# Patient Record
Sex: FEMALE | Race: WHITE | URBAN - METROPOLITAN AREA
[De-identification: names, ages, dates, MRNs, and addresses within clinical notes are randomized per-mention and may not be internally consistent; named-entity substitution may affect disease eponyms.]

---

## 2025-08-08 ENCOUNTER — APPOINTMENT (OUTPATIENT)
Dept: RADIOLOGY | Facility: HOSPITAL | Age: 80
DRG: 064 | End: 2025-08-08
Payer: MEDICARE

## 2025-08-08 ENCOUNTER — APPOINTMENT (OUTPATIENT)
Dept: CARDIOLOGY | Facility: HOSPITAL | Age: 80
DRG: 064 | End: 2025-08-08
Payer: MEDICARE

## 2025-08-08 ENCOUNTER — HOSPITAL ENCOUNTER (INPATIENT)
Facility: HOSPITAL | Age: 80
End: 2025-08-08
Attending: EMERGENCY MEDICINE | Admitting: INTERNAL MEDICINE
Payer: MEDICARE

## 2025-08-08 DIAGNOSIS — R55 SYNCOPE AND COLLAPSE: ICD-10-CM

## 2025-08-08 DIAGNOSIS — N17.9 ACUTE KIDNEY INJURY: ICD-10-CM

## 2025-08-08 DIAGNOSIS — I63.9 ACUTE CVA (CEREBROVASCULAR ACCIDENT) (MULTI): Primary | ICD-10-CM

## 2025-08-08 DIAGNOSIS — I50.32 CHRONIC HEART FAILURE WITH PRESERVED EJECTION FRACTION: ICD-10-CM

## 2025-08-08 DIAGNOSIS — I67.848 OTHER CEREBROVASCULAR VASOSPASM AND VASOCONSTRICTION: ICD-10-CM

## 2025-08-08 PROBLEM — F32.A ANXIETY AND DEPRESSION: Status: ACTIVE | Noted: 2025-06-19

## 2025-08-08 PROBLEM — I27.82 CHRONIC PULMONARY EMBOLISM (MULTI): Status: ACTIVE | Noted: 2025-08-08

## 2025-08-08 PROBLEM — S16.1XXA STRAIN OF NECK MUSCLE: Status: ACTIVE | Noted: 2025-08-08

## 2025-08-08 PROBLEM — R26.81 GAIT INSTABILITY: Status: ACTIVE | Noted: 2025-08-04

## 2025-08-08 PROBLEM — D47.3 ESSENTIAL (HEMORRHAGIC) THROMBOCYTHEMIA: Status: ACTIVE | Noted: 2023-05-31

## 2025-08-08 PROBLEM — N18.30 CRF (CHRONIC RENAL FAILURE), STAGE 3 (MODERATE) (MULTI): Status: ACTIVE | Noted: 2023-05-31

## 2025-08-08 PROBLEM — J34.2 DEVIATED SEPTUM: Status: ACTIVE | Noted: 2025-08-08

## 2025-08-08 PROBLEM — Z96.1 LENS REPLACED: Status: ACTIVE | Noted: 2018-04-18

## 2025-08-08 PROBLEM — M54.16 LUMBAR RADICULITIS: Status: ACTIVE | Noted: 2017-05-22

## 2025-08-08 PROBLEM — R47.01 APHASIA: Status: ACTIVE | Noted: 2025-08-08

## 2025-08-08 PROBLEM — E87.1 HYPONATREMIA: Status: ACTIVE | Noted: 2025-06-19

## 2025-08-08 PROBLEM — N39.0 ACUTE UTI (URINARY TRACT INFECTION): Status: ACTIVE | Noted: 2025-08-08

## 2025-08-08 PROBLEM — G31.84 MCI (MILD COGNITIVE IMPAIRMENT): Status: ACTIVE | Noted: 2025-08-04

## 2025-08-08 PROBLEM — M51.26 LUMBAR HERNIATED DISC: Status: ACTIVE | Noted: 2017-05-22

## 2025-08-08 PROBLEM — I48.0 PAROXYSMAL ATRIAL FIBRILLATION (MULTI): Status: ACTIVE | Noted: 2023-05-31

## 2025-08-08 PROBLEM — E11.9 TYPE 2 DIABETES MELLITUS: Status: ACTIVE | Noted: 2025-08-08

## 2025-08-08 PROBLEM — R73.01 IFG (IMPAIRED FASTING GLUCOSE): Status: ACTIVE | Noted: 2025-06-19

## 2025-08-08 PROBLEM — M48.061 SPINAL STENOSIS OF LUMBAR REGION: Status: ACTIVE | Noted: 2017-05-22

## 2025-08-08 PROBLEM — M43.10 ACQUIRED SPONDYLOLISTHESIS: Status: ACTIVE | Noted: 2017-05-22

## 2025-08-08 PROBLEM — I50.30 (HFPEF) HEART FAILURE WITH PRESERVED EJECTION FRACTION: Status: ACTIVE | Noted: 2025-06-19

## 2025-08-08 PROBLEM — M16.11 PRIMARY OSTEOARTHRITIS OF RIGHT HIP: Status: ACTIVE | Noted: 2025-08-08

## 2025-08-08 PROBLEM — F41.9 ANXIETY AND DEPRESSION: Status: ACTIVE | Noted: 2025-06-19

## 2025-08-08 PROBLEM — R04.0 EPISTAXIS: Status: ACTIVE | Noted: 2025-08-08

## 2025-08-08 PROBLEM — H91.93 BILATERAL HEARING LOSS: Status: ACTIVE | Noted: 2025-08-04

## 2025-08-08 PROBLEM — R82.998 URINE WBC INCREASED: Status: ACTIVE | Noted: 2025-06-19

## 2025-08-08 PROBLEM — M25.559 JOINT PAIN, HIP: Status: ACTIVE | Noted: 2025-08-08

## 2025-08-08 PROBLEM — E66.811 OBESITY, CLASS I, BMI 30-34.9: Status: ACTIVE | Noted: 2018-10-03

## 2025-08-08 PROBLEM — J18.9 PNEUMONIA OF BOTH UPPER LOBES: Status: ACTIVE | Noted: 2025-08-08

## 2025-08-08 PROBLEM — K21.9 GERD (GASTROESOPHAGEAL REFLUX DISEASE): Status: ACTIVE | Noted: 2025-06-19

## 2025-08-08 PROBLEM — E87.6 HYPOKALEMIA: Status: ACTIVE | Noted: 2025-08-08

## 2025-08-08 LAB
ALBUMIN SERPL BCP-MCNC: 4.4 G/DL (ref 3.4–5)
ALP SERPL-CCNC: 78 U/L (ref 33–136)
ALT SERPL W P-5'-P-CCNC: 8 U/L (ref 7–45)
AMMONIA PLAS-SCNC: 27 UMOL/L (ref 16–53)
AMPHETAMINES UR QL SCN: NORMAL
ANION GAP BLDV CALCULATED.4IONS-SCNC: 12 MMOL/L (ref 10–25)
ANION GAP SERPL CALCULATED.3IONS-SCNC: 13 MMOL/L (ref 10–20)
APPEARANCE UR: CLEAR
AST SERPL W P-5'-P-CCNC: 15 U/L (ref 9–39)
BACTERIA #/AREA URNS AUTO: ABNORMAL /HPF
BARBITURATES UR QL SCN: NORMAL
BASE EXCESS BLDV CALC-SCNC: 2.3 MMOL/L (ref -2–3)
BASOPHILS # BLD AUTO: 0.03 X10*3/UL (ref 0–0.1)
BASOPHILS NFR BLD AUTO: 0.3 %
BENZODIAZ UR QL SCN: NORMAL
BILIRUB SERPL-MCNC: 0.5 MG/DL (ref 0–1.2)
BILIRUB UR STRIP.AUTO-MCNC: NEGATIVE MG/DL
BNP SERPL-MCNC: 87 PG/ML (ref 0–99)
BODY TEMPERATURE: 37 DEGREES CELSIUS
BUN SERPL-MCNC: 18 MG/DL (ref 6–23)
BZE UR QL SCN: NORMAL
CA-I BLDV-SCNC: 1.2 MMOL/L (ref 1.1–1.33)
CALCIUM SERPL-MCNC: 9.4 MG/DL (ref 8.6–10.3)
CANNABINOIDS UR QL SCN: NORMAL
CARDIAC TROPONIN I PNL SERPL HS: 6 NG/L (ref 0–13)
CARDIAC TROPONIN I PNL SERPL HS: 7 NG/L (ref 0–13)
CHLORIDE BLDV-SCNC: 100 MMOL/L (ref 98–107)
CHLORIDE SERPL-SCNC: 102 MMOL/L (ref 98–107)
CK SERPL-CCNC: 29 U/L (ref 0–215)
CO2 SERPL-SCNC: 26 MMOL/L (ref 21–32)
COLOR UR: COLORLESS
CREAT SERPL-MCNC: 1.34 MG/DL (ref 0.5–1.05)
EGFRCR SERPLBLD CKD-EPI 2021: 40 ML/MIN/1.73M*2
EOSINOPHIL # BLD AUTO: 0.23 X10*3/UL (ref 0–0.4)
EOSINOPHIL NFR BLD AUTO: 2.6 %
ERYTHROCYTE [DISTWIDTH] IN BLOOD BY AUTOMATED COUNT: 14.9 % (ref 11.5–14.5)
ETHANOL SERPL-MCNC: <10 MG/DL
FENTANYL+NORFENTANYL UR QL SCN: NORMAL
GLUCOSE BLD MANUAL STRIP-MCNC: 122 MG/DL (ref 74–99)
GLUCOSE BLDV-MCNC: 114 MG/DL (ref 74–99)
GLUCOSE SERPL-MCNC: 107 MG/DL (ref 74–99)
GLUCOSE UR STRIP.AUTO-MCNC: NORMAL MG/DL
HCO3 BLDV-SCNC: 27.3 MMOL/L (ref 22–26)
HCT VFR BLD AUTO: 37.3 % (ref 36–46)
HCT VFR BLD EST: 38 % (ref 36–46)
HGB BLD-MCNC: 12.3 G/DL (ref 12–16)
HGB BLDV-MCNC: 12.8 G/DL (ref 12–16)
HYALINE CASTS #/AREA URNS AUTO: ABNORMAL /LPF
IMM GRANULOCYTES # BLD AUTO: 0.06 X10*3/UL (ref 0–0.5)
IMM GRANULOCYTES NFR BLD AUTO: 0.7 % (ref 0–0.9)
INHALED O2 CONCENTRATION: 95 %
INR PPP: 1.1 (ref 0.9–1.2)
KETONES UR STRIP.AUTO-MCNC: NEGATIVE MG/DL
LACTATE BLDV-SCNC: 0.8 MMOL/L (ref 0.4–2)
LEUKOCYTE ESTERASE UR QL STRIP.AUTO: ABNORMAL
LYMPHOCYTES # BLD AUTO: 2.04 X10*3/UL (ref 0.8–3)
LYMPHOCYTES NFR BLD AUTO: 23.2 %
MAGNESIUM SERPL-MCNC: 1.67 MG/DL (ref 1.6–2.4)
MCH RBC QN AUTO: 29.3 PG (ref 26–34)
MCHC RBC AUTO-ENTMCNC: 33 G/DL (ref 32–36)
MCV RBC AUTO: 89 FL (ref 80–100)
METHADONE UR QL SCN: NORMAL
MONOCYTES # BLD AUTO: 0.46 X10*3/UL (ref 0.05–0.8)
MONOCYTES NFR BLD AUTO: 5.2 %
NEUTROPHILS # BLD AUTO: 5.97 X10*3/UL (ref 1.6–5.5)
NEUTROPHILS NFR BLD AUTO: 68 %
NITRITE UR QL STRIP.AUTO: NEGATIVE
NRBC BLD-RTO: 0.2 /100 WBCS (ref 0–0)
OPIATES UR QL SCN: NORMAL
OXYCODONE+OXYMORPHONE UR QL SCN: NORMAL
OXYHGB MFR BLDV: 61.4 % (ref 45–75)
PCO2 BLDV: 43 MM HG (ref 41–51)
PCP UR QL SCN: NORMAL
PH BLDV: 7.41 PH (ref 7.33–7.43)
PH UR STRIP.AUTO: 6.5 [PH]
PHOSPHATE SERPL-MCNC: 3.1 MG/DL (ref 2.5–4.9)
PLATELET # BLD AUTO: 403 X10*3/UL (ref 150–450)
PO2 BLDV: 34 MM HG (ref 35–45)
POTASSIUM BLDV-SCNC: 4 MMOL/L (ref 3.5–5.3)
POTASSIUM SERPL-SCNC: 4 MMOL/L (ref 3.5–5.3)
PROT SERPL-MCNC: 6.9 G/DL (ref 6.4–8.2)
PROT UR STRIP.AUTO-MCNC: NEGATIVE MG/DL
PROTHROMBIN TIME: 11.4 SECONDS (ref 9.3–12.7)
RBC # BLD AUTO: 4.2 X10*6/UL (ref 4–5.2)
RBC # UR STRIP.AUTO: NEGATIVE MG/DL
RBC #/AREA URNS AUTO: ABNORMAL /HPF
SAO2 % BLDV: 63 % (ref 45–75)
SODIUM BLDV-SCNC: 135 MMOL/L (ref 136–145)
SODIUM SERPL-SCNC: 137 MMOL/L (ref 136–145)
SP GR UR STRIP.AUTO: 1.01
SQUAMOUS #/AREA URNS AUTO: ABNORMAL /HPF
UROBILINOGEN UR STRIP.AUTO-MCNC: NORMAL MG/DL
WBC # BLD AUTO: 8.8 X10*3/UL (ref 4.4–11.3)
WBC #/AREA URNS AUTO: ABNORMAL /HPF

## 2025-08-08 PROCEDURE — 87086 URINE CULTURE/COLONY COUNT: CPT | Mod: WESLAB | Performed by: EMERGENCY MEDICINE

## 2025-08-08 PROCEDURE — 70450 CT HEAD/BRAIN W/O DYE: CPT | Performed by: RADIOLOGY

## 2025-08-08 PROCEDURE — 85025 COMPLETE CBC W/AUTO DIFF WBC: CPT | Performed by: EMERGENCY MEDICINE

## 2025-08-08 PROCEDURE — 99291 CRITICAL CARE FIRST HOUR: CPT | Performed by: EMERGENCY MEDICINE

## 2025-08-08 PROCEDURE — 93010 ELECTROCARDIOGRAM REPORT: CPT | Performed by: INTERNAL MEDICINE

## 2025-08-08 PROCEDURE — 72125 CT NECK SPINE W/O DYE: CPT

## 2025-08-08 PROCEDURE — 84075 ASSAY ALKALINE PHOSPHATASE: CPT | Performed by: EMERGENCY MEDICINE

## 2025-08-08 PROCEDURE — 70498 CT ANGIOGRAPHY NECK: CPT | Performed by: RADIOLOGY

## 2025-08-08 PROCEDURE — 83735 ASSAY OF MAGNESIUM: CPT | Performed by: EMERGENCY MEDICINE

## 2025-08-08 PROCEDURE — 84484 ASSAY OF TROPONIN QUANT: CPT | Performed by: EMERGENCY MEDICINE

## 2025-08-08 PROCEDURE — 84100 ASSAY OF PHOSPHORUS: CPT | Performed by: EMERGENCY MEDICINE

## 2025-08-08 PROCEDURE — 71045 X-RAY EXAM CHEST 1 VIEW: CPT | Performed by: RADIOLOGY

## 2025-08-08 PROCEDURE — 2500000004 HC RX 250 GENERAL PHARMACY W/ HCPCS (ALT 636 FOR OP/ED): Performed by: EMERGENCY MEDICINE

## 2025-08-08 PROCEDURE — 83880 ASSAY OF NATRIURETIC PEPTIDE: CPT | Performed by: EMERGENCY MEDICINE

## 2025-08-08 PROCEDURE — 96360 HYDRATION IV INFUSION INIT: CPT

## 2025-08-08 PROCEDURE — 80307 DRUG TEST PRSMV CHEM ANLYZR: CPT | Performed by: EMERGENCY MEDICINE

## 2025-08-08 PROCEDURE — 72125 CT NECK SPINE W/O DYE: CPT | Performed by: RADIOLOGY

## 2025-08-08 PROCEDURE — 0042T CT BRAIN ATTACK PERFUSION W IV CONTRAST: CPT | Performed by: STUDENT IN AN ORGANIZED HEALTH CARE EDUCATION/TRAINING PROGRAM

## 2025-08-08 PROCEDURE — 82947 ASSAY GLUCOSE BLOOD QUANT: CPT

## 2025-08-08 PROCEDURE — 36415 COLL VENOUS BLD VENIPUNCTURE: CPT | Performed by: EMERGENCY MEDICINE

## 2025-08-08 PROCEDURE — 85610 PROTHROMBIN TIME: CPT | Performed by: EMERGENCY MEDICINE

## 2025-08-08 PROCEDURE — 99223 1ST HOSP IP/OBS HIGH 75: CPT | Performed by: INTERNAL MEDICINE

## 2025-08-08 PROCEDURE — 70496 CT ANGIOGRAPHY HEAD: CPT | Performed by: RADIOLOGY

## 2025-08-08 PROCEDURE — 82140 ASSAY OF AMMONIA: CPT | Performed by: EMERGENCY MEDICINE

## 2025-08-08 PROCEDURE — 71045 X-RAY EXAM CHEST 1 VIEW: CPT

## 2025-08-08 PROCEDURE — 82550 ASSAY OF CK (CPK): CPT | Performed by: EMERGENCY MEDICINE

## 2025-08-08 PROCEDURE — 93005 ELECTROCARDIOGRAM TRACING: CPT

## 2025-08-08 PROCEDURE — 2060000001 HC INTERMEDIATE ICU ROOM DAILY

## 2025-08-08 PROCEDURE — 82077 ASSAY SPEC XCP UR&BREATH IA: CPT | Performed by: EMERGENCY MEDICINE

## 2025-08-08 PROCEDURE — 2550000001 HC RX 255 CONTRASTS: Performed by: EMERGENCY MEDICINE

## 2025-08-08 PROCEDURE — 70450 CT HEAD/BRAIN W/O DYE: CPT

## 2025-08-08 PROCEDURE — 0042T CT BRAIN ATTACK PERFUSION W IV CONTRAST: CPT

## 2025-08-08 PROCEDURE — 81001 URINALYSIS AUTO W/SCOPE: CPT | Performed by: EMERGENCY MEDICINE

## 2025-08-08 PROCEDURE — 70498 CT ANGIOGRAPHY NECK: CPT

## 2025-08-08 PROCEDURE — G0427 INPT/ED TELECONSULT70: HCPCS | Performed by: STUDENT IN AN ORGANIZED HEALTH CARE EDUCATION/TRAINING PROGRAM

## 2025-08-08 PROCEDURE — 84132 ASSAY OF SERUM POTASSIUM: CPT | Performed by: EMERGENCY MEDICINE

## 2025-08-08 RX ORDER — FENOFIBRATE 54 MG/1
TABLET ORAL
Status: ON HOLD | COMMUNITY

## 2025-08-08 RX ORDER — FUROSEMIDE 20 MG/1
10 TABLET ORAL
Status: ON HOLD | COMMUNITY
End: 2025-08-10

## 2025-08-08 RX ORDER — ASPIRIN 300 MG/1
300 SUPPOSITORY RECTAL DAILY
Status: DISCONTINUED | OUTPATIENT
Start: 2025-08-09 | End: 2025-08-08

## 2025-08-08 RX ORDER — CEFTRIAXONE 1 G/50ML
1 INJECTION, SOLUTION INTRAVENOUS EVERY 24 HOURS
Status: DISPENSED | OUTPATIENT
Start: 2025-08-08 | End: 2025-08-13

## 2025-08-08 RX ORDER — DEXTROSE 50 % IN WATER (D50W) INTRAVENOUS SYRINGE
12.5
Status: ACTIVE | OUTPATIENT
Start: 2025-08-08

## 2025-08-08 RX ORDER — PANTOPRAZOLE SODIUM 40 MG/10ML
40 INJECTION, POWDER, LYOPHILIZED, FOR SOLUTION INTRAVENOUS NIGHTLY
Status: COMPLETED | OUTPATIENT
Start: 2025-08-08 | End: 2025-08-10

## 2025-08-08 RX ORDER — METFORMIN HYDROCHLORIDE 500 MG/1
TABLET, EXTENDED RELEASE ORAL
Status: ON HOLD | COMMUNITY

## 2025-08-08 RX ORDER — BUPROPION HYDROCHLORIDE 150 MG/1
150 TABLET, EXTENDED RELEASE ORAL
Status: ON HOLD | COMMUNITY
Start: 2025-05-13

## 2025-08-08 RX ORDER — HEPARIN SODIUM 5000 [USP'U]/ML
5000 INJECTION, SOLUTION INTRAVENOUS; SUBCUTANEOUS EVERY 8 HOURS
Status: DISPENSED | OUTPATIENT
Start: 2025-08-08

## 2025-08-08 RX ORDER — ACETAMINOPHEN 160 MG/5ML
650 SOLUTION ORAL EVERY 4 HOURS PRN
Status: ACTIVE | OUTPATIENT
Start: 2025-08-08

## 2025-08-08 RX ORDER — NITROFURANTOIN 25; 75 MG/1; MG/1
CAPSULE ORAL
Status: ON HOLD | COMMUNITY

## 2025-08-08 RX ORDER — NAPROXEN SODIUM 220 MG/1
81 TABLET, FILM COATED ORAL DAILY
Status: DISCONTINUED | OUTPATIENT
Start: 2025-08-09 | End: 2025-08-08

## 2025-08-08 RX ORDER — ATORVASTATIN CALCIUM 80 MG/1
80 TABLET, FILM COATED ORAL ONCE
Status: DISCONTINUED | OUTPATIENT
Start: 2025-08-08 | End: 2025-08-08

## 2025-08-08 RX ORDER — CIPROFLOXACIN 250 MG/1
1 TABLET, FILM COATED ORAL
Status: ON HOLD | COMMUNITY
Start: 2025-05-22

## 2025-08-08 RX ORDER — NAPROXEN SODIUM 220 MG/1
162 TABLET, FILM COATED ORAL ONCE
Status: DISCONTINUED | OUTPATIENT
Start: 2025-08-08 | End: 2025-08-08

## 2025-08-08 RX ORDER — ATORVASTATIN CALCIUM 20 MG/1
20 TABLET, FILM COATED ORAL NIGHTLY
Status: DISCONTINUED | OUTPATIENT
Start: 2025-08-08 | End: 2025-08-08

## 2025-08-08 RX ORDER — TRAMADOL HYDROCHLORIDE 50 MG/1
TABLET, FILM COATED ORAL
Status: ON HOLD | COMMUNITY
Start: 2016-08-23

## 2025-08-08 RX ORDER — METOPROLOL TARTRATE 100 MG/1
100 TABLET ORAL 2 TIMES DAILY
Status: ON HOLD | COMMUNITY
Start: 2025-07-08

## 2025-08-08 RX ORDER — ALLOPURINOL 100 MG/1
TABLET ORAL
Status: ON HOLD | COMMUNITY

## 2025-08-08 RX ORDER — ASPIRIN 300 MG/1
600 SUPPOSITORY RECTAL ONCE
Status: DISCONTINUED | OUTPATIENT
Start: 2025-08-08 | End: 2025-08-08

## 2025-08-08 RX ORDER — OMEPRAZOLE 20 MG/1
20 CAPSULE, DELAYED RELEASE ORAL
Status: ON HOLD | COMMUNITY

## 2025-08-08 RX ORDER — ASPIRIN 300 MG/1
300 SUPPOSITORY RECTAL NIGHTLY
Status: DISCONTINUED | OUTPATIENT
Start: 2025-08-08 | End: 2025-08-09

## 2025-08-08 RX ORDER — POLYETHYLENE GLYCOL 3350 17 G/17G
17 POWDER, FOR SOLUTION ORAL DAILY PRN
Status: ACTIVE | OUTPATIENT
Start: 2025-08-08

## 2025-08-08 RX ORDER — FAMOTIDINE 20 MG/1
1 TABLET, FILM COATED ORAL
Status: ON HOLD | COMMUNITY
Start: 2025-05-06

## 2025-08-08 RX ORDER — ACETAMINOPHEN 650 MG/1
650 SUPPOSITORY RECTAL EVERY 4 HOURS PRN
Status: ACTIVE | OUTPATIENT
Start: 2025-08-08

## 2025-08-08 RX ORDER — SERTRALINE HYDROCHLORIDE 100 MG/1
150 TABLET, FILM COATED ORAL
Status: ON HOLD | COMMUNITY
Start: 2025-07-29

## 2025-08-08 RX ORDER — EMPAGLIFLOZIN 10 MG/1
10 TABLET, FILM COATED ORAL
Status: ON HOLD | COMMUNITY
Start: 2025-04-22

## 2025-08-08 RX ORDER — ALBUTEROL SULFATE 0.83 MG/ML
2.5 SOLUTION RESPIRATORY (INHALATION) EVERY 6 HOURS PRN
Status: ACTIVE | OUTPATIENT
Start: 2025-08-08

## 2025-08-08 RX ORDER — ACETAMINOPHEN 325 MG/1
650 TABLET ORAL EVERY 4 HOURS PRN
Status: ACTIVE | OUTPATIENT
Start: 2025-08-08

## 2025-08-08 RX ORDER — DEXTROSE 50 % IN WATER (D50W) INTRAVENOUS SYRINGE
25
Status: ACTIVE | OUTPATIENT
Start: 2025-08-08

## 2025-08-08 RX ORDER — AMLODIPINE BESYLATE 10 MG/1
10 TABLET ORAL DAILY
Status: ON HOLD | COMMUNITY
Start: 2024-08-21

## 2025-08-08 RX ORDER — ATORVASTATIN CALCIUM 40 MG/1
40 TABLET, FILM COATED ORAL
Status: ON HOLD | COMMUNITY
Start: 2025-07-15

## 2025-08-08 RX ORDER — ASPIRIN 81 MG/1
81 TABLET ORAL DAILY
Status: DISCONTINUED | OUTPATIENT
Start: 2025-08-09 | End: 2025-08-08

## 2025-08-08 RX ORDER — LOSARTAN POTASSIUM 100 MG/1
100 TABLET ORAL
Status: ON HOLD | COMMUNITY
Start: 2024-08-20

## 2025-08-08 RX ORDER — LABETALOL HYDROCHLORIDE 5 MG/ML
10 INJECTION, SOLUTION INTRAVENOUS EVERY 10 MIN PRN
Status: ACTIVE | OUTPATIENT
Start: 2025-08-08 | End: 2025-08-10

## 2025-08-08 RX ADMIN — IOHEXOL 50 ML: 350 INJECTION, SOLUTION INTRAVENOUS at 18:19

## 2025-08-08 RX ADMIN — IOHEXOL 70 ML: 350 INJECTION, SOLUTION INTRAVENOUS at 21:02

## 2025-08-08 RX ADMIN — SODIUM CHLORIDE 1000 ML: 9 INJECTION, SOLUTION INTRAVENOUS at 17:51

## 2025-08-08 SDOH — HEALTH STABILITY: PHYSICAL HEALTH
HOW OFTEN DO YOU NEED TO HAVE SOMEONE HELP YOU WHEN YOU READ INSTRUCTIONS, PAMPHLETS, OR OTHER WRITTEN MATERIAL FROM YOUR DOCTOR OR PHARMACY?: PATIENT UNABLE TO RESPOND

## 2025-08-08 SDOH — SOCIAL STABILITY: SOCIAL INSECURITY: WITHIN THE LAST YEAR, HAVE YOU BEEN HUMILIATED OR EMOTIONALLY ABUSED IN OTHER WAYS BY YOUR PARTNER OR EX-PARTNER?: NO

## 2025-08-08 SDOH — ECONOMIC STABILITY: FOOD INSECURITY: WITHIN THE PAST 12 MONTHS, THE FOOD YOU BOUGHT JUST DIDN'T LAST AND YOU DIDN'T HAVE MONEY TO GET MORE.: NEVER TRUE

## 2025-08-08 SDOH — SOCIAL STABILITY: SOCIAL NETWORK: HOW OFTEN DO YOU ATTEND MEETINGS OF THE CLUBS OR ORGANIZATIONS YOU BELONG TO?: NEVER

## 2025-08-08 SDOH — SOCIAL STABILITY: SOCIAL INSECURITY: ARE THERE ANY APPARENT SIGNS OF INJURIES/BEHAVIORS THAT COULD BE RELATED TO ABUSE/NEGLECT?: NO

## 2025-08-08 SDOH — SOCIAL STABILITY: SOCIAL INSECURITY: HAVE YOU HAD ANY THOUGHTS OF HARMING ANYONE ELSE?: NO

## 2025-08-08 SDOH — HEALTH STABILITY: PHYSICAL HEALTH
ON AVERAGE, HOW MANY DAYS PER WEEK DO YOU ENGAGE IN MODERATE TO STRENUOUS EXERCISE (LIKE A BRISK WALK)?: PATIENT UNABLE TO ANSWER

## 2025-08-08 SDOH — SOCIAL STABILITY: SOCIAL INSECURITY: WITHIN THE LAST YEAR, HAVE YOU BEEN AFRAID OF YOUR PARTNER OR EX-PARTNER?: NO

## 2025-08-08 SDOH — SOCIAL STABILITY: SOCIAL INSECURITY
WITHIN THE LAST YEAR, HAVE YOU BEEN RAPED OR FORCED TO HAVE ANY KIND OF SEXUAL ACTIVITY BY YOUR PARTNER OR EX-PARTNER?: NO

## 2025-08-08 SDOH — HEALTH STABILITY: MENTAL HEALTH: HOW OFTEN DO YOU HAVE A DRINK CONTAINING ALCOHOL?: NEVER

## 2025-08-08 SDOH — ECONOMIC STABILITY: FOOD INSECURITY: WITHIN THE PAST 12 MONTHS, YOU WORRIED THAT YOUR FOOD WOULD RUN OUT BEFORE YOU GOT THE MONEY TO BUY MORE.: NEVER TRUE

## 2025-08-08 SDOH — SOCIAL STABILITY: SOCIAL INSECURITY
WITHIN THE LAST YEAR, HAVE YOU BEEN KICKED, HIT, SLAPPED, OR OTHERWISE PHYSICALLY HURT BY YOUR PARTNER OR EX-PARTNER?: NO

## 2025-08-08 SDOH — HEALTH STABILITY: PHYSICAL HEALTH: ON AVERAGE, HOW MANY MINUTES DO YOU ENGAGE IN EXERCISE AT THIS LEVEL?: PATIENT UNABLE TO ANSWER

## 2025-08-08 SDOH — SOCIAL STABILITY: SOCIAL NETWORK
IN A TYPICAL WEEK, HOW MANY TIMES DO YOU TALK ON THE PHONE WITH FAMILY, FRIENDS, OR NEIGHBORS?: MORE THAN THREE TIMES A WEEK

## 2025-08-08 SDOH — SOCIAL STABILITY: SOCIAL NETWORK
DO YOU BELONG TO ANY CLUBS OR ORGANIZATIONS SUCH AS CHURCH GROUPS, UNIONS, FRATERNAL OR ATHLETIC GROUPS, OR SCHOOL GROUPS?: NO

## 2025-08-08 SDOH — SOCIAL STABILITY: SOCIAL INSECURITY: ARE YOU OR HAVE YOU BEEN THREATENED OR ABUSED PHYSICALLY, EMOTIONALLY, OR SEXUALLY BY ANYONE?: NO

## 2025-08-08 SDOH — SOCIAL STABILITY: SOCIAL INSECURITY: DOES ANYONE TRY TO KEEP YOU FROM HAVING/CONTACTING OTHER FRIENDS OR DOING THINGS OUTSIDE YOUR HOME?: NO

## 2025-08-08 SDOH — ECONOMIC STABILITY: INCOME INSECURITY: IN THE PAST 12 MONTHS HAS THE ELECTRIC, GAS, OIL, OR WATER COMPANY THREATENED TO SHUT OFF SERVICES IN YOUR HOME?: NO

## 2025-08-08 SDOH — SOCIAL STABILITY: SOCIAL NETWORK

## 2025-08-08 SDOH — SOCIAL STABILITY: SOCIAL NETWORK: HOW OFTEN DO YOU ATTEND CHURCH OR RELIGIOUS SERVICES?: NEVER

## 2025-08-08 SDOH — SOCIAL STABILITY: SOCIAL INSECURITY: ABUSE: ADULT

## 2025-08-08 SDOH — SOCIAL STABILITY: SOCIAL INSECURITY: ARE YOU MARRIED, WIDOWED, DIVORCED, SEPARATED, NEVER MARRIED, OR LIVING WITH A PARTNER?: WIDOWED

## 2025-08-08 SDOH — ECONOMIC STABILITY: FOOD INSECURITY: HOW HARD IS IT FOR YOU TO PAY FOR THE VERY BASICS LIKE FOOD, HOUSING, MEDICAL CARE, AND HEATING?: NOT HARD AT ALL

## 2025-08-08 SDOH — HEALTH STABILITY: MENTAL HEALTH
DO YOU FEEL STRESS - TENSE, RESTLESS, NERVOUS, OR ANXIOUS, OR UNABLE TO SLEEP AT NIGHT BECAUSE YOUR MIND IS TROUBLED ALL THE TIME - THESE DAYS?: PATIENT UNABLE TO ANSWER

## 2025-08-08 SDOH — SOCIAL STABILITY: SOCIAL INSECURITY: DO YOU FEEL UNSAFE GOING BACK TO THE PLACE WHERE YOU ARE LIVING?: NO

## 2025-08-08 SDOH — SOCIAL STABILITY: SOCIAL INSECURITY: HAVE YOU HAD THOUGHTS OF HARMING ANYONE ELSE?: NO

## 2025-08-08 SDOH — SOCIAL STABILITY: SOCIAL INSECURITY: DO YOU FEEL ANYONE HAS EXPLOITED OR TAKEN ADVANTAGE OF YOU FINANCIALLY OR OF YOUR PERSONAL PROPERTY?: NO

## 2025-08-08 SDOH — SOCIAL STABILITY: SOCIAL INSECURITY: HAS ANYONE EVER THREATENED TO HURT YOUR FAMILY OR YOUR PETS?: NO

## 2025-08-08 ASSESSMENT — PATIENT HEALTH QUESTIONNAIRE - PHQ9
SUM OF ALL RESPONSES TO PHQ9 QUESTIONS 1 & 2: 0
2. FEELING DOWN, DEPRESSED OR HOPELESS: NOT AT ALL
1. LITTLE INTEREST OR PLEASURE IN DOING THINGS: NOT AT ALL

## 2025-08-08 ASSESSMENT — PAIN SCALES - PAIN ASSESSMENT IN ADVANCED DEMENTIA (PAINAD)
BREATHING: NORMAL
BODYLANGUAGE: RELAXED
FACIALEXPRESSION: SMILING OR INEXPRESSIVE
TOTALSCORE: 0
CONSOLABILITY: NO NEED TO CONSOLE

## 2025-08-08 ASSESSMENT — LIFESTYLE VARIABLES
SKIP TO QUESTIONS 9-10: 1
HOW OFTEN DO YOU HAVE A DRINK CONTAINING ALCOHOL: NEVER
AUDIT-C TOTAL SCORE: 0
HOW MANY STANDARD DRINKS CONTAINING ALCOHOL DO YOU HAVE ON A TYPICAL DAY: PATIENT DOES NOT DRINK
AUDIT-C TOTAL SCORE: 0
HOW OFTEN DO YOU HAVE 6 OR MORE DRINKS ON ONE OCCASION: NEVER
SUBSTANCE_ABUSE_PAST_12_MONTHS: NO
PRESCIPTION_ABUSE_PAST_12_MONTHS: NO

## 2025-08-08 ASSESSMENT — PAIN - FUNCTIONAL ASSESSMENT
PAIN_FUNCTIONAL_ASSESSMENT: PAINAD (PAIN ASSESSMENT IN ADVANCED DEMENTIA SCALE)
PAIN_FUNCTIONAL_ASSESSMENT: 0-10

## 2025-08-08 ASSESSMENT — PAIN SCALES - GENERAL: PAINLEVEL_OUTOF10: 0 - NO PAIN

## 2025-08-08 ASSESSMENT — ACTIVITIES OF DAILY LIVING (ADL): LACK_OF_TRANSPORTATION: NO

## 2025-08-08 NOTE — ED PROVIDER NOTES
Emergency Department Provider Note        MEDICAL DECISION MAKING:      Chief Complaint   Patient presents with    Cerebrovascular Accident     Per EMS pt found down        History/Exam limitations: communication barrier aphasia.   Additional history was obtained from EMS personnel and past medical records.    HPI: Karey Oakley  is a 80 y.o. with a history of hypertension, CKD 3, hyperlipidemia, HFpEF, chronic hyponatremia was sent to ED after son went over to her house because she was not answering her phone.  He found her on the floor.  Called EMS 15 minutes later because he could not get her up.  Patient does not provide any history.  HPI is limited from family as well.  Past medical history and surgical history reviewed and as documented.  History reviewed and as noted. past medical records reviewed.    Past medical records, triage/nursing notes and vital signs reviewed as available and as noted below  Active Ambulatory Problems     Diagnosis Date Noted    (HFpEF) heart failure with preserved ejection fraction 06/19/2025    Acquired spondylolisthesis 05/22/2017    Anxiety and depression 06/19/2025    Chronic bilateral low back pain with bilateral sciatica 12/22/2016    Bilateral hearing loss 08/04/2025    Chronic diastolic (congestive) heart failure 06/19/2025    CRF (chronic renal failure), stage 3 (moderate) (Multi) 05/31/2023    Deviated septum 08/08/2025    Dyslipidemia 11/18/2015    Epistaxis 08/08/2025    Essential (hemorrhagic) thrombocythemia 05/31/2023    Essential hypertension 11/18/2015    Gait instability 08/04/2025    GERD (gastroesophageal reflux disease) 06/19/2025    Hypokalemia 08/08/2025    Hyponatremia 06/19/2025    IFG (impaired fasting glucose) 06/19/2025    Joint pain, hip 08/08/2025    Lens replaced 04/18/2018    Lumbar degenerative disc disease 12/22/2016    Lumbar herniated disc 05/22/2017    Lumbar radiculitis 05/22/2017    Muscle weakness (generalized) 03/01/2012    MCI (mild  cognitive impairment) 08/04/2025    Obesity, Class I, BMI 30-34.9 10/03/2018    Spinal stenosis of lumbar region 05/22/2017    Primary osteoarthritis of right hip 08/08/2025    Paroxysmal atrial fibrillation (Multi) 05/31/2023    Senile nuclear sclerosis 10/03/2012    Strain of neck muscle 08/08/2025    Urine WBC increased 06/19/2025     Resolved Ambulatory Problems     Diagnosis Date Noted    No Resolved Ambulatory Problems     Past Medical History:   Diagnosis Date    Personal history of other diseases of the circulatory system     Personal history of other endocrine, nutritional and metabolic disease     Personal history of other endocrine, nutritional and metabolic disease         Surgical History[1]     Family History[2]     Social History[3]     Current Outpatient Medications   Medication Instructions    allopurinol (Zyloprim) 100 mg tablet oral    amLODIPine (NORVASC) 10 mg, Daily    APIXABAN ORAL oral    atorvastatin (LIPITOR) 40 mg, Daily RT    buPROPion SR (WELLBUTRIN SR) 150 mg, Daily RT    ciprofloxacin (Cipro) 250 mg tablet 1 tablet, Every 12 hours scheduled (0630,1830)    estrogens (conjugated) (PREMARIN) 1 g, vaginal, Daily RT    famotidine (Pepcid) 20 mg tablet 1 tablet, Daily (0630)    fenofibrate (Tricor) 54 mg tablet oral    furosemide (LASIX) 10 mg, oral    Jardiance 10 mg, Daily with breakfast    losartan (COZAAR) 100 mg, Daily RT    metFORMIN  mg 24 hr tablet oral    metoprolol tartrate (LOPRESSOR) 100 mg, 2 times daily    nitrofurantoin, macrocrystal-monohydrate, (Macrobid) 100 mg capsule oral    omeprazole (PRILOSEC) 20 mg, oral, Daily RT    sertraline (ZOLOFT) 150 mg, Daily RT    traMADol (Ultram) 50 mg tablet Take by mouth.      Last Known Well Time: Unknown. 3 Days last normal        -------------------------------------------------------------------------------------------------  /52   Pulse 66   Temp 36.4 °C (97.5 °F) (Oral)   Resp (!) 21   Wt 84.4 kg (186 lb 1.1 oz)    SpO2 95%      Physical Exam  Vitals and nursing note reviewed.   Constitutional:       General: She is not in acute distress.     Appearance: She is not toxic-appearing.      Interventions: Cervical collar in place.   HENT:      Head: Atraumatic.      Nose: Nose normal.      Mouth/Throat:      Mouth: Mucous membranes are moist.     Eyes:      Extraocular Movements: Extraocular movements intact.      Pupils: Pupils are equal, round, and reactive to light.      Visual Fields: Right eye visual fields normal and left eye visual fields normal.     Neck:      Vascular: No carotid bruit or JVD.      Trachea: Trachea normal.     Cardiovascular:      Rate and Rhythm: Normal rate and regular rhythm.      Pulses: Normal pulses.      Heart sounds: Normal heart sounds.   Pulmonary:      Effort: Pulmonary effort is normal.      Breath sounds: Normal breath sounds.   Abdominal:      General: There is no distension.      Palpations: Abdomen is soft.      Tenderness: There is no abdominal tenderness. There is no guarding or rebound.     Musculoskeletal:         General: No swelling, tenderness, deformity or signs of injury.      Cervical back: No edema. Muscular tenderness present.     Skin:     General: Skin is warm and dry.      Capillary Refill: Capillary refill takes less than 2 seconds.     Neurological:      Mental Status: She is alert.      GCS: GCS eye subscore is 4. GCS verbal subscore is 2. GCS motor subscore is 5.      Cranial Nerves: Cranial nerve deficit, dysarthria and facial asymmetry present.      Sensory: Sensation is intact.      Motor: Weakness present. No tremor, abnormal muscle tone or seizure activity.      Coordination: Finger-Nose-Finger Test abnormal.     Psychiatric:         Attention and Perception: Attention normal.         Mood and Affect: Mood normal.         Speech: She is noncommunicative.         Behavior: Behavior is cooperative.            Interval: Baseline  Time: 5:06 PM  Person Administering  Scale: Marty R LeJeune, DO   1A. Level of Consciousness: 0  1B. Ask Month and Age: 2  1C. Blink Eyes & Squeeze Hands: 1  2. Best Gaze: 0  3. Visual: 0  4. Facial Palsy: 1  5A. Motor - Left Arm: 1  5B. Motor - Right Arm: 2  6A. Motor - Left Leg: 3  6B. Motor - Right Leg: 3  7. Limb Ataxia: 1  8. Sensory Loss: 1  9. Best Language: 3  10. Dysarthria: 0  11. Extinction and Inattention: 1  NIH Stroke Scale: 19           VAN: VAN: Positive     ------------------------------------------------------------------------------------------------------------------------------------------         EKG interpreted by myself     Independent Interpretation of Studies: I independently interpreted the CT head and see No obvious evidence of intracranial hemorrhage    Social Determinants of Health Significantly Affecting Care: None     External Records Reviewed: I reviewed recent and relevant outside records including: Past medical record     Discussion of Management with Other Providers:   I discussed the patient/results with: Stroke neurology and the admitting team      IV Thrombolysis IV Thrombolysis Checklist        IV Thrombolysis Given: No; Thrombolysis contraindication reason: Time from Last Known Well (or stroke onset) is >4.5 hours  and Patient receiving direct thrombin inhibitors or direct Factor Xa inhibitors        ED COURSE        Work-up performed to evaluate for differential diagnosis as clinically indicated   Orders Placed This Encounter   Procedures    Urine Culture    CT head wo IV contrast    XR chest 1 view    CT angio head and neck w and wo IV contrast    CT cervical spine wo IV contrast    CT brain attack perfusion w IV contrast    Cardiac Enzymes - CPK    CBC and Auto Differential    Phosphorus    Magnesium    Comprehensive metabolic panel    Troponin I Series, High Sensitivity (0, 1 HR)    Blood Gas Venous Full Panel    B-Type Natriuretic Peptide    Protime-INR    Ammonia    Drug Screen, Urine    Ethanol     Urinalysis with Reflex Culture and Microscopic    Troponin I, High Sensitivity, Initial    Urinalysis with Reflex Culture and Microscopic    Extra Urine Gray Tube    Troponin, High Sensitivity, 1 Hour    Microscopic Only, Urine    NPO Diet; Effective now    Inpatient consult to Neuro TeleStroke    ECG 12 lead    Insert and maintain peripheral IV    Initiate Request to another  Facility or Exempt Unit ([facility], -Owned Rehab, Hospice)    ED to floor bed request        Labs and imaging reviewed by me  and note _  Labs Reviewed   CBC WITH AUTO DIFFERENTIAL - Abnormal       Result Value    WBC 8.8      nRBC 0.2 (*)     RBC 4.20      Hemoglobin 12.3      Hematocrit 37.3      MCV 89      MCH 29.3      MCHC 33.0      RDW 14.9 (*)     Platelets 403      Neutrophils % 68.0      Immature Granulocytes %, Automated 0.7      Lymphocytes % 23.2      Monocytes % 5.2      Eosinophils % 2.6      Basophils % 0.3      Neutrophils Absolute 5.97 (*)     Immature Granulocytes Absolute, Automated 0.06      Lymphocytes Absolute 2.04      Monocytes Absolute 0.46      Eosinophils Absolute 0.23      Basophils Absolute 0.03     COMPREHENSIVE METABOLIC PANEL - Abnormal    Glucose 107 (*)     Sodium 137      Potassium 4.0      Chloride 102      Bicarbonate 26      Anion Gap 13      Urea Nitrogen 18      Creatinine 1.34 (*)     eGFR 40 (*)     Calcium 9.4      Albumin 4.4      Alkaline Phosphatase 78      Total Protein 6.9      AST 15      Bilirubin, Total 0.5      ALT 8     BLOOD GAS VENOUS FULL PANEL - Abnormal    POCT pH, Venous 7.41      POCT pCO2, Venous 43      POCT pO2, Venous 34 (*)     POCT SO2, Venous 63      POCT Oxy Hemoglobin, Venous 61.4      POCT Hematocrit Calculated, Venous 38.0      POCT Sodium, Venous 135 (*)     POCT Potassium, Venous 4.0      POCT Chloride, Venous 100      POCT Ionized Calicum, Venous 1.20      POCT Glucose, Venous 114 (*)     POCT Lactate, Venous 0.8      POCT Base Excess, Venous 2.3       POCT HCO3 Calculated, Venous 27.3 (*)     POCT Hemoglobin, Venous 12.8      POCT Anion Gap, Venous 12.0      Patient Temperature 37.0      FiO2 95     URINALYSIS WITH REFLEX CULTURE AND MICROSCOPIC - Abnormal    Color, Urine Colorless (*)     Appearance, Urine Clear      Specific Gravity, Urine 1.011      pH, Urine 6.5      Protein, Urine NEGATIVE      Glucose, Urine Normal      Blood, Urine NEGATIVE      Ketones, Urine NEGATIVE      Bilirubin, Urine NEGATIVE      Urobilinogen, Urine Normal      Nitrite, Urine NEGATIVE      Leukocyte Esterase, Urine 25 Sarai/uL (*)    MICROSCOPIC ONLY, URINE - Abnormal    WBC, Urine 6-10 (*)     RBC, Urine 1-2      Squamous Epithelial Cells, Urine 1-9 (SPARSE)      Bacteria, Urine 1+ (*)     Hyaline Casts, Urine 1+ (*)    CREATINE KINASE - Normal    Creatine Kinase 29     PHOSPHORUS - Normal    Phosphorus 3.1     MAGNESIUM - Normal    Magnesium 1.67     B-TYPE NATRIURETIC PEPTIDE - Normal    BNP 87      Narrative:        <100 pg/mL - Heart failure unlikely  100-299 pg/mL - Intermediate probability of acute heart                  failure exacerbation. Correlate with clinical                  context and patient history.    >=300 pg/mL - Heart Failure likely. Correlate with clinical                  context and patient history.    BNP testing is performed using different testing methodology at [facility] than at other [facility]. Direct result comparisons should only be made within the same method.      PROTIME-INR - Normal    Protime 11.4      INR 1.1      Narrative:     INR Therapeutic Range: 2.0-3.5   AMMONIA - Normal    Ammonia 27     DRUG SCREEN,URINE - Normal    Amphetamine Screen, Urine Presumptive Negative      Barbiturate Screen, Urine Presumptive Negative      Benzodiazepines Screen, Urine Presumptive Negative      Cannabinoid Screen, Urine Presumptive Negative      Cocaine Metabolite Screen, Urine Presumptive Negative      Fentanyl Screen, Urine  Presumptive Negative      Opiate Screen, Urine Presumptive Negative      Oxycodone Screen, Urine Presumptive Negative      PCP Screen, Urine Presumptive Negative      Methadone Screen, Urine Presumptive Negative      Narrative:     Drug screen results are presumptive and should not be used to assess   compliance with prescribed medication. Contact the performing [facility] laboratory   to add-on definitive confirmatory testing if clinically indicated.    Toxicology screening results are reported qualitatively. The concentration must   be greater than or equal to the cutoff to be reported as positive. The concentration   at which the screening test can detect an individual drug or metabolite varies.   The absence of expected drug(s) and/or drug metabolite(s) may indicate non-compliance,   inappropriate timing of specimen collection relative to drug administration, poor drug   absorption, diluted/adulterated urine, or limitations of testing. For medical purposes   only; not valid for forensic use.    Interpretive questions should be directed to the laboratory medical directors.   ALCOHOL - Normal    Alcohol <10     SERIAL TROPONIN-INITIAL - Normal    Troponin I, High Sensitivity 7      Narrative:     Less than 99th percentile of normal range cutoff-  Female and children under 18 years old <14 ng/L; Male <21 ng/L: Negative  Repeat testing should be performed if clinically indicated.     Female and children under 18 years old 14-50 ng/L; Male 21-50 ng/L:  Consistent with possible cardiac damage and possible increased clinical   risk. Serial measurements may help to assess extent of myocardial damage.     >50 ng/L: Consistent with cardiac damage, increased clinical risk and  myocardial infarction. Serial measurements may help assess extent of   myocardial damage.      NOTE: Children less than 1 year old may have higher baseline troponin   levels and results should be interpreted in conjunction with the overall   clinical  context.     NOTE: Troponin I testing is performed using a different   testing methodology at [facility] than at other   [facility]. Direct result comparisons should only   be made within the same method.   SERIAL TROPONIN, 1 HOUR - Normal    Troponin I, High Sensitivity 6      Narrative:     Less than 99th percentile of normal range cutoff-  Female and children under 18 years old <14 ng/L; Male <21 ng/L: Negative  Repeat testing should be performed if clinically indicated.     Female and children under 18 years old 14-50 ng/L; Male 21-50 ng/L:  Consistent with possible cardiac damage and possible increased clinical   risk. Serial measurements may help to assess extent of myocardial damage.     >50 ng/L: Consistent with cardiac damage, increased clinical risk and  myocardial infarction. Serial measurements may help assess extent of   myocardial damage.      NOTE: Children less than 1 year old may have higher baseline troponin   levels and results should be interpreted in conjunction with the overall   clinical context.     NOTE: Troponin I testing is performed using a different   testing methodology at [facility] than at other   [facility]. Direct result comparisons should only   be made within the same method.   URINE CULTURE   TROPONIN SERIES- (INITIAL, 1 HR)    Narrative:     The following orders were created for panel order Troponin I Series, High Sensitivity (0, 1 HR).  Procedure                               Abnormality         Status                     ---------                               -----------         ------                     Troponin I, High Sensiti...[[id number]]  Normal              Final result               Troponin, High Sensitivi...[[id number]]  Normal              Final result                 Please view results for these tests on the individual orders.   URINALYSIS WITH REFLEX CULTURE AND MICROSCOPIC    Narrative:     The following orders were  created for panel order Urinalysis with Reflex Culture and Microscopic.  Procedure                               Abnormality         Status                     ---------                               -----------         ------                     Urinalysis with Reflex C...[[id number]]  Abnormal            Final result               Extra Urine Gray Tube[[id number]]                            In process                   Please view results for these tests on the individual orders.   EXTRA URINE GRAY TUBE      CT angio head and neck w and wo IV contrast   Final Result   1. Abrupt cut off distal M1 segment left middle cerebral artery,   presumed thrombosis, with attenuated arborization of the left middle   cerebral artery.   2. No flow-limiting stenosis or significant plaque irregularity in   the neck.   3. Multifocal ground-glass opacities both lungs. Findings could be   related to edema or infection.        MACRO:   Ritchie Dominguez discussed the significance and urgency of this   critical finding by EPIC secure chat with  MARTY LEJEUNE on 8/8/2025   at 7:04 pm.  (**-RCF-**) Findings:  See findings.        Signed by: Ritchie Dominguez 8/8/2025 7:04 PM   Dictation workstation:   [id number]      CT cervical spine wo IV contrast   Final Result   No evidence for an acute fracture or subluxation of the cervical   spine. Mild degenerative changes from C4-5 to C6-7.   Vertebral bodies appear demineralized.        MACRO:   None        Signed by: Jamee Justice 8/8/2025 6:53 PM   Dictation workstation:   [id number]      XR chest 1 view   Final Result   Cardiomegaly with mild interstitial prominence which could be chronic   or relate to component of developing interstitial edema/CHF.   Correlate clinically.        MACRO:   None        Signed by: Jacqui Hedrick 8/8/2025 5:54 PM   Dictation workstation:   [id number]      CT head wo IV contrast   Final Result   A hyperdense left MCA sign is suspected. Further evaluation with CT    angiogram and/or dedicated brain MRI/MRA is recommended.        Chronic changes as noted above.        MACRO:   Jacqui Hedrick discussed the significance and urgency of this critical   finding by telephone with  MARTY LEJEUNE on 8/8/2025 at 5:51 pm.   (**-RCF-**) Findings:  See findings.        Signed by: Jacqui Hedrick 8/8/2025 5:53 PM   Dictation workstation:   [id number]      CT brain attack perfusion w IV contrast    (Results Pending)        Pt course which Intervention and treatment included   Medications   aspirin chewable tablet 162 mg (has no administration in time range)   atorvastatin (Lipitor) tablet 80 mg (has no administration in time range)   sodium chloride 0.9 % bolus 500 mL (has no administration in time range)   sodium chloride 0.9 % bolus 1,000 mL (1,000 mL intravenous New Bag 8/8/25 1751)   iohexol (OMNIPaque) 350 mg iodine/mL solution 50 mL (50 mL intravenous Given 8/8/25 1819)        ED Course as of 08/08/25 2054   Fri Aug 08, 2025   1821 Twelve-lead EKG notes sinus bradycardia 56 beats minute.  Normal intervals.  Normal morphology.  No ST elevations or depressions [ML]   1910 Spoke with stroke attending GILBERT Thomson CT brain perfusion ordered.  In consultation with IR, patient was not deemed a candidate for tPA or vascular reperfusion.  Patient does have elevated creatinine, IV fluids given.  IV fluids given prior to CT angio for renal protection given SARAH.  Patient will be admitted for further management evaluation and investigation.  Patient given high dose of atorvastatin as well as aspirin.  Of note she is on Eliquis but unsure when the last time she took it. [ML]   2051 Hyaline Casts, Urine(!): 1+  Likely dehydration [ML]      ED Course User Index  [ML] Marty R Lejeune, DO         Diagnoses as of 08/08/25 2054   Acute CVA (cerebrovascular accident) (Multi)   Acute kidney injury      Procedure  Critical Care    Performed by: Marty R Lejeune, DO  Authorized by: Marty R Lejeune, DO     Critical care provider statement:     Critical care time (minutes):  38    Critical care time was exclusive of:  Separately billable procedures and treating other patients    Critical care was necessary to treat or prevent imminent or life-threatening deterioration of the following conditions:  CNS failure or compromise    Critical care was time spent personally by me on the following activities:  Development of treatment plan with patient or surrogate, discussions with consultants, discussions with primary provider, evaluation of patient's response to treatment, examination of patient, obtaining history from patient or surrogate, review of old charts, re-evaluation of patient's condition, pulse oximetry, ordering and review of radiographic studies, ordering and review of laboratory studies and ordering and performing treatments and interventions    Care discussed with: admitting provider and accepting provider at another facility       Disposition: Admit to hospital.  All imaging and laboratory results were discussed with the patient in detail including acute as well as incidental findings.  I discussed the differential, results and treatment plan with the patient and/or family/friend/caregiver if present.  I discussed the reason and need for admission to the hospital as well as risk and benefits.  Patient/family/caregiver/friend is in agreement with transfer as well as choice of facility to be transferred to.  Education and reassurance provided regards to presumed diagnosis was given.  Patient/family/caregiver understand and all questions answered.         [1]   Past Surgical History:  Procedure Laterality Date    TOTAL KNEE ARTHROPLASTY  09/25/2013    Total Knee Arthroplasty   [2] No family history on file.  [3]         Marty R Lejeune, DO  Resident  08/08/25 [id number]

## 2025-08-08 NOTE — CONSULTS
"Inpatient consult to Neuro TeleStroke  Consult performed by: Arnel Pablo MD  Consult ordered by: Marty R Lejeune, DO        Virtual Visit start time: 716pm, cart moved to correct patient's room at 719pm    History Of Present Illness:  Historian: Family and ED Provider   Karey Oakley \"mick" is a 80 y.o. female presenting with not being able to speak.  Patient lives alone and family spoke to her at 7 pm yesterday  As she was not answering her phone today, they came to check on her and found her on the floor at 4 pm.   Unclear symptom onset.  Nonverbal with impaired comprehension.    Lives alone and is independent with all ADLs.  Walks with rollator.    Last known well: 7 pm 8/7/2025  Had stroke symptoms resolved at time of presentation: No   Current antiplatelet/anticoagulant use: None    Prior Functional Status (Modified [place] Scale):  3 The patient has moderate disability; required some external help but able to walk without the assistance of another individual.     Stroke Risk Factors:  Hypertension and Hyperlipidemia    Last Recorded Vitals:  Blood pressure 131/69, pulse 59, temperature 36.4 °C (97.5 °F), temperature source Oral, resp. rate 16, height 1.6 m (5' 3\"), weight 84.4 kg (186 lb 1.1 oz), SpO2 96%.    Physical Exam:  Awake, alert.  Nonverbal with impaired comprehension.  Mimics commands.  Symmetric face.   did not blink to threat in either eyes.  Drift in right lower extremity, no drift in any other extremities    [facility] NIHSS:   NIH Stroke Scale:     1A. Level of Consciousness:  Alert (keenly responsive) (0)    1B. Ask Month and Age:  0 questions right (+2)    1C. Blink Eyes & Squeeze Hands:  Performs 0 tasks (+2)    2. Best Gaze:  Normal (0)    3. Visual:  No visual loss (0)    4. Facial Palsy:  Normal symmetry (0)    5A. Motor - Left Arm:  No drift (0)    5B. Motor - Right Arm:  No drift (0)    6A. Motor - Left Leg:  No drift (0)    6B. Motor - Right Leg:  Drift (+1)    7. Limb Ataxia:  No " ataxia (0)    8. Sensory Loss:  Normal (no sensory loss) (0)    9. Best Language:  Mute/global aphasia (+3)    10. Dysarthia:  Severe dysarthria (+2)    11. Extinction and Inattention:  No abnormality (0)    NIH Stroke Scale:  10         Relevant Results:  LABS:  Glucose   Date Value Ref Range Status   08/08/2025 107 (H) 74 - 99 mg/dL Final     INR   Date Value Ref Range Status   08/08/2025 1.1 0.9 - 1.2 Final      Results for orders placed or performed during the hospital encounter of 08/08/25 (from the past 24 hours)   Cardiac Enzymes - CPK   Result Value Ref Range    Creatine Kinase 29 0 - 215 U/L   CBC and Auto Differential   Result Value Ref Range    WBC 8.8 4.4 - 11.3 x10*3/uL    nRBC 0.2 (H) 0.0 - 0.0 /100 WBCs    RBC 4.20 4.00 - 5.20 x10*6/uL    Hemoglobin 12.3 12.0 - 16.0 g/dL    Hematocrit 37.3 36.0 - 46.0 %    MCV 89 80 - 100 fL    MCH 29.3 26.0 - 34.0 pg    MCHC 33.0 32.0 - 36.0 g/dL    RDW 14.9 (H) 11.5 - 14.5 %    Platelets 403 150 - 450 x10*3/uL    Neutrophils % 68.0 40.0 - 80.0 %    Immature Granulocytes %, Automated 0.7 0.0 - 0.9 %    Lymphocytes % 23.2 13.0 - 44.0 %    Monocytes % 5.2 2.0 - 10.0 %    Eosinophils % 2.6 0.0 - 6.0 %    Basophils % 0.3 0.0 - 2.0 %    Neutrophils Absolute 5.97 (H) 1.60 - 5.50 x10*3/uL    Immature Granulocytes Absolute, Automated 0.06 0.00 - 0.50 x10*3/uL    Lymphocytes Absolute 2.04 0.80 - 3.00 x10*3/uL    Monocytes Absolute 0.46 0.05 - 0.80 x10*3/uL    Eosinophils Absolute 0.23 0.00 - 0.40 x10*3/uL    Basophils Absolute 0.03 0.00 - 0.10 x10*3/uL   Phosphorus   Result Value Ref Range    Phosphorus 3.1 2.5 - 4.9 mg/dL   Magnesium   Result Value Ref Range    Magnesium 1.67 1.60 - 2.40 mg/dL   Comprehensive metabolic panel   Result Value Ref Range    Glucose 107 (H) 74 - 99 mg/dL    Sodium 137 136 - 145 mmol/L    Potassium 4.0 3.5 - 5.3 mmol/L    Chloride 102 98 - 107 mmol/L    Bicarbonate 26 21 - 32 mmol/L    Anion Gap 13 10 - 20 mmol/L    Urea Nitrogen 18 6 - 23 mg/dL     Creatinine 1.34 (H) 0.50 - 1.05 mg/dL    eGFR 40 (L) >60 mL/min/1.73m*2    Calcium 9.4 8.6 - 10.3 mg/dL    Albumin 4.4 3.4 - 5.0 g/dL    Alkaline Phosphatase 78 33 - 136 U/L    Total Protein 6.9 6.4 - 8.2 g/dL    AST 15 9 - 39 U/L    Bilirubin, Total 0.5 0.0 - 1.2 mg/dL    ALT 8 7 - 45 U/L   Blood Gas Venous Full Panel   Result Value Ref Range    POCT pH, Venous 7.41 7.33 - 7.43 pH    POCT pCO2, Venous 43 41 - 51 mm Hg    POCT pO2, Venous 34 (L) 35 - 45 mm Hg    POCT SO2, Venous 63 45 - 75 %    POCT Oxy Hemoglobin, Venous 61.4 45.0 - 75.0 %    POCT Hematocrit Calculated, Venous 38.0 36.0 - 46.0 %    POCT Sodium, Venous 135 (L) 136 - 145 mmol/L    POCT Potassium, Venous 4.0 3.5 - 5.3 mmol/L    POCT Chloride, Venous 100 98 - 107 mmol/L    POCT Ionized [place], Venous 1.20 1.10 - 1.33 mmol/L    POCT Glucose, Venous 114 (H) 74 - 99 mg/dL    POCT Lactate, Venous 0.8 0.4 - 2.0 mmol/L    POCT Base Excess, Venous 2.3 -2.0 - 3.0 mmol/L    POCT HCO3 Calculated, Venous 27.3 (H) 22.0 - 26.0 mmol/L    POCT Hemoglobin, Venous 12.8 12.0 - 16.0 g/dL    POCT Anion Gap, Venous 12.0 10.0 - 25.0 mmol/L    Patient Temperature 37.0 degrees Celsius    FiO2 95 %   B-Type Natriuretic Peptide   Result Value Ref Range    BNP 87 0 - 99 pg/mL   Protime-INR   Result Value Ref Range    Protime 11.4 9.3 - 12.7 seconds    INR 1.1 0.9 - 1.2   Ammonia   Result Value Ref Range    Ammonia 27 16 - 53 umol/L   Ethanol   Result Value Ref Range    Alcohol <10 <=10 mg/dL   Troponin I, High Sensitivity, Initial   Result Value Ref Range    Troponin I, High Sensitivity 7 0 - 13 ng/L   Troponin, High Sensitivity, 1 Hour   Result Value Ref Range    Troponin I, High Sensitivity 6 0 - 13 ng/L        CT Head Imaging:  CTH imaging personally reviewed, showed no acute ischemic / hemorrhagic changes     CTA Head and Neck Imaging:  CTA head and neck imaging personally reviewed, positive for large vessel occlusion or severe stenosis Left M1 occlusion    CT Perfusion  Imaging:      Diagnosis:  Supect Acute Ischemic Stroke    Assessment/Plan:  80-year-old presenting with unclear last known normal with global aphasia suspicious for left MCA stroke.  CT angio showed left M1 occlusion with no significant ischemic changes (subtl;e early ischemic changes in L insula and L temporal lobe).    Discussed with IR reg thrombectomy given no significant changes in CTH, (ASPECTS 8). Thrombectomy is not offered due to LKN, pt's age and functional status.     Considered wake up protocol after EVT discussion but out of window to get MRI brain with 4.5 hrs after being found with stroke symptoms.     IV Thrombolysis IV Thrombolysis Checklist        IV Thrombolysis Given: No; Thrombolysis contraindication reason: Time from Last Known Well (or stroke onset) is >4.5 hours           Patient is a candidate for thrombectomy:  yes/no: No; contraindication reason: Other Discussed with IR reg thrombectomy given no significant changes in CTH, (ASPECTS 8). Thrombectomy is not offered due to LKN, pt's age and functional status.     Additional Recommendations:  MRI Brain w/o contrast stroke protocol or repeat CTH 24 hours after initial CTH if unable to get MRI.  EKG, troponin.  TTE w/ bubble study.  Holter    Lipid panel, A1c.  Load with 300mg Plavix now, then start 75mg daily tomorrow, Load with 325mg ASA now, then start 81mg daily starting tomorrow, for 90 days.  If found to have A fib or DVT/PE with PFO, consider transitioning to anticoagulation.   Lipid Goals: education on healthy diet and statin therapy to maintain or achieve goal LDL-cholesterol < 70mg. Atorvastatin 40mg..  Blood pressure goals: avoid hypotension SBP <100 that could worsen cerebral perfusion. Ischemic stroke- early permissive hypertension SBP < 220 mmHg with cautious inpatient lowering..  Glucose Goals: early treatment of hyperglycemia to goal glucose 140-180 mg/dl with long-term goal A1c < 7%.  NPO until nurse bedside swallow  assessment.  Consider focused stroke order set.  Smoking Cessation and Education.  Assessment for Rehabilitation needs.  Patient and family education on signs and symptoms of stroke, calling 911, healthy strategies for stroke prevention.      Disposition:  Patient will remain at referring facility for further evaluation and management.    Virtual or Telephone Consent    An interactive audio and video telecommunication system which permits real time communications between the patient (at the originating site) and provider (at the distant site) was utilized to provide this telehealth service.   Verbal consent was requested and obtained from Karey Oakley on this date, 08/09/25 for a telehealth visit.      Telestroke is covered in shift work. If there are further Neurological questions or concerns please contact your regional neurologist on call during daytime hours or contact the transfer center with an ADT20 order.    Arnel Pablo MD

## 2025-08-08 NOTE — Clinical Note
[facility] Recommendation: [facility] [place]- Rec. (08/08/25 1924 : Mónica Contreras RN)

## 2025-08-09 ENCOUNTER — APPOINTMENT (OUTPATIENT)
Dept: CARDIOLOGY | Facility: HOSPITAL | Age: 80
End: 2025-08-09
Payer: MEDICARE

## 2025-08-09 ENCOUNTER — APPOINTMENT (OUTPATIENT)
Dept: RADIOLOGY | Facility: HOSPITAL | Age: 80
DRG: 064 | End: 2025-08-09
Payer: MEDICARE

## 2025-08-09 LAB
ALBUMIN SERPL BCP-MCNC: 3.9 G/DL (ref 3.4–5)
ALP SERPL-CCNC: 66 U/L (ref 33–136)
ALT SERPL W P-5'-P-CCNC: 6 U/L (ref 7–45)
ANION GAP SERPL CALCULATED.3IONS-SCNC: 12 MMOL/L
AORTIC VALVE MEAN GRADIENT: 10 MMHG
AORTIC VALVE PEAK VELOCITY: 2.26 M/S
AST SERPL W P-5'-P-CCNC: 12 U/L (ref 9–39)
AV PEAK GRADIENT: 20 MMHG
AVA (PEAK VEL): 1.85 CM2
AVA (VTI): 2.05 CM2
BASOPHILS # BLD AUTO: 0.02 X10*3/UL (ref 0–0.1)
BASOPHILS NFR BLD AUTO: 0.2 %
BILIRUB SERPL-MCNC: 0.4 MG/DL (ref 0–1.2)
BUN SERPL-MCNC: 12 MG/DL (ref 6–23)
CALCIUM SERPL-MCNC: 8.9 MG/DL (ref 8.6–10.3)
CHLORIDE SERPL-SCNC: 105 MMOL/L (ref 98–107)
CHOLEST SERPL-MCNC: 150 MG/DL (ref 0–199)
CHOLEST/HDLC SERPL: 3.9 {RATIO}
CO2 SERPL-SCNC: 24 MMOL/L (ref 21–32)
CREAT SERPL-MCNC: 0.94 MG/DL (ref 0.5–1.05)
EGFRCR SERPLBLD CKD-EPI 2021: 61 ML/MIN/1.73M*2
EJECTION FRACTION APICAL 4 CHAMBER: 64.8
EJECTION FRACTION: 58 %
EOSINOPHIL # BLD AUTO: 0.2 X10*3/UL (ref 0–0.4)
EOSINOPHIL NFR BLD AUTO: 2.2 %
ERYTHROCYTE [DISTWIDTH] IN BLOOD BY AUTOMATED COUNT: 14.6 % (ref 11.5–14.5)
EST. AVERAGE GLUCOSE BLD GHB EST-MCNC: 111 MG/DL
GLOBAL LONGITUDINAL STRAIN: -15.1 %
GLUCOSE BLD MANUAL STRIP-MCNC: 114 MG/DL (ref 74–99)
GLUCOSE BLD MANUAL STRIP-MCNC: 117 MG/DL (ref 74–99)
GLUCOSE BLD MANUAL STRIP-MCNC: 120 MG/DL (ref 74–99)
GLUCOSE BLD MANUAL STRIP-MCNC: 129 MG/DL (ref 74–99)
GLUCOSE SERPL-MCNC: 103 MG/DL (ref 74–99)
HBA1C MFR BLD: 5.5 % (ref ?–5.7)
HCT VFR BLD AUTO: 33.2 % (ref 36–46)
HDLC SERPL-MCNC: 38.8 MG/DL
HGB BLD-MCNC: 11.1 G/DL (ref 12–16)
IMM GRANULOCYTES # BLD AUTO: 0.07 X10*3/UL (ref 0–0.5)
IMM GRANULOCYTES NFR BLD AUTO: 0.8 % (ref 0–0.9)
LDLC SERPL CALC-MCNC: 67 MG/DL
LEFT ATRIUM VOLUME AREA LENGTH INDEX BSA: 42.7 ML/M2
LEFT VENTRICLE INTERNAL DIMENSION DIASTOLE: 4.17 CM (ref 3.5–6)
LEFT VENTRICULAR OUTFLOW TRACT DIAMETER: 2.05 CM
LV EJECTION FRACTION BIPLANE: 55 %
LYMPHOCYTES # BLD AUTO: 2.44 X10*3/UL (ref 0.8–3)
LYMPHOCYTES NFR BLD AUTO: 27.4 %
MCH RBC QN AUTO: 29.1 PG (ref 26–34)
MCHC RBC AUTO-ENTMCNC: 33.4 G/DL (ref 32–36)
MCV RBC AUTO: 87 FL (ref 80–100)
MITRAL VALVE E/A RATIO: 1.04
MONOCYTES # BLD AUTO: 0.48 X10*3/UL (ref 0.05–0.8)
MONOCYTES NFR BLD AUTO: 5.4 %
NEUTROPHILS # BLD AUTO: 5.71 X10*3/UL (ref 1.6–5.5)
NEUTROPHILS NFR BLD AUTO: 64 %
NON HDL CHOLESTEROL: 111 MG/DL (ref 0–149)
NRBC BLD-RTO: 0 /100 WBCS (ref 0–0)
PLATELET # BLD AUTO: 333 X10*3/UL (ref 150–450)
POTASSIUM SERPL-SCNC: 3.4 MMOL/L (ref 3.5–5.3)
PROT SERPL-MCNC: 5.7 G/DL (ref 6.4–8.2)
RBC # BLD AUTO: 3.81 X10*6/UL (ref 4–5.2)
RIGHT VENTRICLE PEAK SYSTOLIC PRESSURE: 34 MMHG
SODIUM SERPL-SCNC: 138 MMOL/L (ref 136–145)
TRIGL SERPL-MCNC: 222 MG/DL (ref 0–149)
VLDL: 44 MG/DL (ref 0–40)
WBC # BLD AUTO: 8.9 X10*3/UL (ref 4.4–11.3)

## 2025-08-09 PROCEDURE — 2500000001 HC RX 250 WO HCPCS SELF ADMINISTERED DRUGS (ALT 637 FOR MEDICARE OP): Performed by: EMERGENCY MEDICINE

## 2025-08-09 PROCEDURE — 2500000001 HC RX 250 WO HCPCS SELF ADMINISTERED DRUGS (ALT 637 FOR MEDICARE OP): Performed by: STUDENT IN AN ORGANIZED HEALTH CARE EDUCATION/TRAINING PROGRAM

## 2025-08-09 PROCEDURE — 99222 1ST HOSP IP/OBS MODERATE 55: CPT | Performed by: INTERNAL MEDICINE

## 2025-08-09 PROCEDURE — 2500000004 HC RX 250 GENERAL PHARMACY W/ HCPCS (ALT 636 FOR OP/ED): Performed by: INTERNAL MEDICINE

## 2025-08-09 PROCEDURE — 97166 OT EVAL MOD COMPLEX 45 MIN: CPT | Mod: GO

## 2025-08-09 PROCEDURE — 76376 3D RENDER W/INTRP POSTPROCES: CPT | Performed by: INTERNAL MEDICINE

## 2025-08-09 PROCEDURE — 97163 PT EVAL HIGH COMPLEX 45 MIN: CPT | Mod: GP

## 2025-08-09 PROCEDURE — 82947 ASSAY GLUCOSE BLOOD QUANT: CPT

## 2025-08-09 PROCEDURE — 92523 SPEECH SOUND LANG COMPREHEN: CPT | Mod: GN | Performed by: SPEECH-LANGUAGE PATHOLOGIST

## 2025-08-09 PROCEDURE — 93356 MYOCRD STRAIN IMG SPCKL TRCK: CPT | Performed by: INTERNAL MEDICINE

## 2025-08-09 PROCEDURE — 93306 TTE W/DOPPLER COMPLETE: CPT | Performed by: INTERNAL MEDICINE

## 2025-08-09 PROCEDURE — 93306 TTE W/DOPPLER COMPLETE: CPT

## 2025-08-09 PROCEDURE — 80053 COMPREHEN METABOLIC PANEL: CPT | Performed by: INTERNAL MEDICINE

## 2025-08-09 PROCEDURE — 84145 PROCALCITONIN (PCT): CPT | Mod: WESLAB | Performed by: INTERNAL MEDICINE

## 2025-08-09 PROCEDURE — 70551 MRI BRAIN STEM W/O DYE: CPT | Performed by: RADIOLOGY

## 2025-08-09 PROCEDURE — 2500000002 HC RX 250 W HCPCS SELF ADMINISTERED DRUGS (ALT 637 FOR MEDICARE OP, ALT 636 FOR OP/ED): Performed by: INTERNAL MEDICINE

## 2025-08-09 PROCEDURE — 99233 SBSQ HOSP IP/OBS HIGH 50: CPT | Performed by: INTERNAL MEDICINE

## 2025-08-09 PROCEDURE — 70551 MRI BRAIN STEM W/O DYE: CPT

## 2025-08-09 PROCEDURE — 36415 COLL VENOUS BLD VENIPUNCTURE: CPT | Performed by: INTERNAL MEDICINE

## 2025-08-09 PROCEDURE — 80061 LIPID PANEL: CPT | Performed by: INTERNAL MEDICINE

## 2025-08-09 PROCEDURE — 97530 THERAPEUTIC ACTIVITIES: CPT | Mod: GO

## 2025-08-09 PROCEDURE — 2060000001 HC INTERMEDIATE ICU ROOM DAILY

## 2025-08-09 PROCEDURE — 99223 1ST HOSP IP/OBS HIGH 75: CPT | Performed by: STUDENT IN AN ORGANIZED HEALTH CARE EDUCATION/TRAINING PROGRAM

## 2025-08-09 PROCEDURE — 2500000004 HC RX 250 GENERAL PHARMACY W/ HCPCS (ALT 636 FOR OP/ED): Performed by: EMERGENCY MEDICINE

## 2025-08-09 PROCEDURE — 92610 EVALUATE SWALLOWING FUNCTION: CPT | Mod: GN | Performed by: SPEECH-LANGUAGE PATHOLOGIST

## 2025-08-09 PROCEDURE — 85025 COMPLETE CBC W/AUTO DIFF WBC: CPT | Performed by: INTERNAL MEDICINE

## 2025-08-09 PROCEDURE — 83036 HEMOGLOBIN GLYCOSYLATED A1C: CPT | Mod: WESLAB | Performed by: INTERNAL MEDICINE

## 2025-08-09 RX ORDER — DEXTROSE MONOHYDRATE AND SODIUM CHLORIDE 5; .9 G/100ML; G/100ML
50 INJECTION, SOLUTION INTRAVENOUS CONTINUOUS
Status: DISCONTINUED | OUTPATIENT
Start: 2025-08-09 | End: 2025-08-09

## 2025-08-09 RX ORDER — CLOPIDOGREL BISULFATE 75 MG/1
75 TABLET ORAL DAILY
Status: DISPENSED | OUTPATIENT
Start: 2025-08-09

## 2025-08-09 RX ORDER — ASPIRIN 81 MG/1
81 TABLET ORAL DAILY
Status: DISPENSED | OUTPATIENT
Start: 2025-08-09

## 2025-08-09 RX ORDER — POTASSIUM CHLORIDE 20 MEQ/1
20 TABLET, EXTENDED RELEASE ORAL ONCE
Status: COMPLETED | OUTPATIENT
Start: 2025-08-09 | End: 2025-08-09

## 2025-08-09 RX ADMIN — HEPARIN SODIUM 5000 UNITS: 5000 INJECTION INTRAVENOUS; SUBCUTANEOUS at 13:46

## 2025-08-09 RX ADMIN — PANTOPRAZOLE SODIUM 40 MG: 40 INJECTION, POWDER, FOR SOLUTION INTRAVENOUS at 00:39

## 2025-08-09 RX ADMIN — ASPIRIN 81 MG: 81 TABLET, DELAYED RELEASE ORAL at 13:45

## 2025-08-09 RX ADMIN — HEPARIN SODIUM 5000 UNITS: 5000 INJECTION INTRAVENOUS; SUBCUTANEOUS at 00:38

## 2025-08-09 RX ADMIN — PANTOPRAZOLE SODIUM 40 MG: 40 INJECTION, POWDER, FOR SOLUTION INTRAVENOUS at 20:40

## 2025-08-09 RX ADMIN — CLOPIDOGREL 75 MG: 75 TABLET ORAL at 13:46

## 2025-08-09 RX ADMIN — SODIUM CHLORIDE 500 ML: 900 INJECTION, SOLUTION INTRAVENOUS at 00:33

## 2025-08-09 RX ADMIN — POTASSIUM CHLORIDE 20 MEQ: 1500 TABLET, EXTENDED RELEASE ORAL at 13:48

## 2025-08-09 RX ADMIN — CEFTRIAXONE 1 G: 1 INJECTION, SOLUTION INTRAVENOUS at 01:11

## 2025-08-09 RX ADMIN — DEXTROSE MONOHYDRATE AND SODIUM CHLORIDE 50 ML/HR: 5; .9 INJECTION, SOLUTION INTRAVENOUS at 04:38

## 2025-08-09 RX ADMIN — HEPARIN SODIUM 5000 UNITS: 5000 INJECTION INTRAVENOUS; SUBCUTANEOUS at 10:00

## 2025-08-09 RX ADMIN — HEPARIN SODIUM 5000 UNITS: 5000 INJECTION INTRAVENOUS; SUBCUTANEOUS at 22:54

## 2025-08-09 RX ADMIN — CEFTRIAXONE 1 G: 1 INJECTION, SOLUTION INTRAVENOUS at 22:54

## 2025-08-09 RX ADMIN — ASPIRIN 300 MG: 300 SUPPOSITORY RECTAL at 00:48

## 2025-08-09 ASSESSMENT — ACTIVITIES OF DAILY LIVING (ADL)
TOILETING: UNABLE TO ASSESS
BATHING: UNABLE TO ASSESS
WALKS IN HOME: INDEPENDENT
DRESSING YOURSELF: UNABLE TO ASSESS
FEEDING YOURSELF: UNABLE TO ASSESS
GROOMING: UNABLE TO ASSESS
DRESSING YOURSELF: UNABLE TO ASSESS
ADL_ASSISTANCE: INDEPENDENT
JUDGMENT_ADEQUATE_SAFELY_COMPLETE_DAILY_ACTIVITIES: UNABLE TO ASSESS
GROOMING: UNABLE TO ASSESS
WALKS IN HOME: INDEPENDENT
TOILETING: UNABLE TO ASSESS
ADEQUATE_TO_COMPLETE_ADL: YES
BATHING: UNABLE TO ASSESS
HEARING - RIGHT EAR: FUNCTIONAL
BATHING_ASSISTANCE: MODERATE
PATIENT'S MEMORY ADEQUATE TO SAFELY COMPLETE DAILY ACTIVITIES?: UNABLE TO ASSESS
JUDGMENT_ADEQUATE_SAFELY_COMPLETE_DAILY_ACTIVITIES: UNABLE TO ASSESS
FEEDING YOURSELF: UNABLE TO ASSESS
HEARING - RIGHT EAR: FUNCTIONAL
ADEQUATE_TO_COMPLETE_ADL: YES
HEARING - LEFT EAR: FUNCTIONAL
PATIENT'S MEMORY ADEQUATE TO SAFELY COMPLETE DAILY ACTIVITIES?: UNABLE TO ASSESS
HEARING - LEFT EAR: FUNCTIONAL

## 2025-08-09 ASSESSMENT — COGNITIVE AND FUNCTIONAL STATUS - GENERAL
TURNING FROM BACK TO SIDE WHILE IN FLAT BAD: A LITTLE
DRESSING REGULAR UPPER BODY CLOTHING: A LOT
MOVING TO AND FROM BED TO CHAIR: TOTAL
HELP NEEDED FOR BATHING: A LOT
EATING MEALS: A LITTLE
DAILY ACTIVITIY SCORE: 13
MOVING FROM LYING ON BACK TO SITTING ON SIDE OF FLAT BED WITH BEDRAILS: A LITTLE
CLIMB 3 TO 5 STEPS WITH RAILING: A LOT
EATING MEALS: A LITTLE
MOBILITY SCORE: 16
STANDING UP FROM CHAIR USING ARMS: A LITTLE
HELP NEEDED FOR BATHING: A LOT
DAILY ACTIVITIY SCORE: 11
MOVING FROM LYING ON BACK TO SITTING ON SIDE OF FLAT BED WITH BEDRAILS: A LOT
PATIENT BASELINE BEDBOUND: NO
WALKING IN HOSPITAL ROOM: A LOT
DRESSING REGULAR UPPER BODY CLOTHING: A LOT
DRESSING REGULAR LOWER BODY CLOTHING: TOTAL
PERSONAL GROOMING: A LOT
TOILETING: TOTAL
MOVING TO AND FROM BED TO CHAIR: A LITTLE
PERSONAL GROOMING: A LOT
CLIMB 3 TO 5 STEPS WITH RAILING: TOTAL
TOILETING: A LOT
DRESSING REGULAR LOWER BODY CLOTHING: A LOT
MOBILITY SCORE: 8
STANDING UP FROM CHAIR USING ARMS: TOTAL
WALKING IN HOSPITAL ROOM: TOTAL
TURNING FROM BACK TO SIDE WHILE IN FLAT BAD: A LOT

## 2025-08-09 ASSESSMENT — PAIN - FUNCTIONAL ASSESSMENT
PAIN_FUNCTIONAL_ASSESSMENT: 0-10
PAIN_FUNCTIONAL_ASSESSMENT: 0-10
PAIN_FUNCTIONAL_ASSESSMENT: FLACC (FACE, LEGS, ACTIVITY, CRY, CONSOLABILITY)

## 2025-08-09 ASSESSMENT — PAIN SCALES - GENERAL
PAINLEVEL_OUTOF10: 0 - NO PAIN

## 2025-08-09 NOTE — CARE PLAN
The patient's goals for the shift include Unable    The clinical goals for the shift include Remain free of injury    Over the shift, the patient did not make progress toward the following goals. Barriers to progression include   Problem: General Stroke  Goal: Establish a mutual long term goal with patient by discharge  Outcome: Progressing  Goal: Demonstrate improvement in neurological exam throughout the shift  Outcome: Progressing  Goal: Maintain BP within ordered limits throughout shift  Outcome: Progressing  Goal: Participate in treatment (ie., meds, therapy) throughout shift  Outcome: Progressing  Goal: No symptoms of aspiration throughout shift  Outcome: Progressing  Goal: No symptoms of hemorrhage throughout shift  Outcome: Progressing  Goal: Tolerate enteral feeding throughout shift  Outcome: Progressing  Goal: Decreased nausea/vomiting throughout shift  Outcome: Progressing  Goal: Controlled blood glucose throughout shift  Outcome: Progressing  Goal: Out of bed three times today  Outcome: Progressing   Recommendations to address these barriers include

## 2025-08-09 NOTE — PROGRESS NOTES
"Speech-Language Pathology    SLP Adult Inpatient Speech-Language Pathology Clinical Swallow Evaluation    Patient Name: Karey Oakley \"marilyn\"  MRN: [id number]  Today's Date: 8/9/2025   Time Calculation  Start Time: 0940  Stop Time: 1012  Time Calculation (min): 32 min     Current Problem:   1. Acute CVA (cerebrovascular accident) (Multi)  Transthoracic Echo Complete    Transthoracic Echo Complete      2. Acute kidney injury        3. Syncope and collapse  Transthoracic Echo Complete    Transthoracic Echo Complete          SLP Assessment:  Pt presents with swallowing abilities WFL at this time.    No skilled ST services are warranted for swallow abilities at this time.    SLP TX Intervention Outcomes: Evaluation Only  Prognosis: N/A  Treatment Tolerance: Patient tolerated treatment well  Medical Staff Made Aware: Yes  Strengths: Cooperation, motivation  Barriers: medical status    Plan:  Inpatient/Swing Bed or Outpatient: Inpatient  Treatment/Interventions:   SLP Plan: Evaluation Only  Diet Recommendations:   Solid Consistency: Regular  Liquid Consistency: Thin  Discussed POC: Patient, Nursing  Discussed Risks/Benefits: Yes  Patient/Caregiver Agreeable: Yes    Subjective   Current Problem:  80 y.o. female presenting with Chief Complaint of Altered Mental Status. Patient presents to [facility] ED via EMS after she was found down on the floor at home by a family member. Patient unable to produce any speech and she was found to have right-sided weakness. Unknown last known well. Patient arrived in c-collar. Patient unable to follow commands.     General Visit Information:  Chart Reviewed: Yes  Patient Class: Inpatient  Living Environment: Home  Ordering Physician: Yolanda Gilliland  Reason for Referral: aphasia and dysarthria  Referred By: Yolanda Gilliland  Past Medical History Relevant to Rehab: Medical History[1]   Prior Level of Function: WFL  BaseLine Diet: Regular/Thin  Current Diet : NPO  Prior to " Session Communication: Nursing    Objective:  Pain:  Pain Assessment: 0-10  Pain Score: 0  Dysphagia Diagnosis: WFL    Baseline Assessment:  Respiratory Status: Room air  Behavior/Cognition: Impaired  Patient Positioning: Upright in bed  Baseline Vocal Quality: WFL  Volitional Cough: WFL  Volitional Swallow: delayed      Oral/Motor Assessment:  Vocal Quality: WFL  Vocal Quality Impairment: N/A  Breath Support: WFL  Management of secretions: WFL  Lingual ROM and strength: WFL  Labial ROM and strength: WFL  Dentition: adequate    Consistencies Trialed:  Consistencies Trialed: Yes  Consistencies Trialed: Thin, Regular      Clinical Observations:  Patient Positioning: upright in bed  Management of Oral Secretions: WFL  Was The 3 oz Swallow Protocol Completed: Yes- Pass  Signs/Symptoms of Aspiration: N/A  Prolonged Oral Manipulation: N/A  Immediate Cough: N/A  Delayed Cough: N/A    Inpatient Education:  Education Documentation  Pt educated on results of evaluation and recommendations made.  Education Comments  Pt reported understanding and agreement.     Recommendations:  Risk for Aspiration: No  Additional Recommendations: N/A  Solid Diet Recommendations : Regular  Liquid Diet Recommendations: Thin  Compensatory Swallowing Strategies: small bites/sips, alternating bites/sips, reduced rate of intake, upright positioning    Consultations/Referrals/Coordination of Services: N/A                       [1]   Past Medical History:  Diagnosis Date    Arthritis     Hypertension     Personal history of other diseases of the circulatory system     History of hypertension    Personal history of other endocrine, nutritional and metabolic disease     History of hypercholesterolemia    Personal history of other endocrine, nutritional and metabolic disease     History of diabetes mellitus

## 2025-08-09 NOTE — ASSESSMENT & PLAN NOTE
Pulmonary hypertension clinic note from July 1, 2025 [facility] stated  Presented to Dr. Joyce for evaluation of chronic PE, who noted improvement in left sided thrombotic burden but ongoing thrombotic burden on the right and an echo with RVSP 41. Subsequent imaging showed improvement/ resolution in chronic clot burden. Referred to PH clinic for further work up. Of note, he did not recommend anticoagulation.   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

## 2025-08-09 NOTE — PROGRESS NOTES
Spiritual Care Visit  Spiritual Care Request    Reason for Visit:  Routine Visit: Introduction     Request Received From:       Focus of Care:  Visited With: Patient         Refer to :          Spiritual Care Assessment    Spiritual Assessment:                      Care Provided:       Sense of Community and or Jainism Affiliation:  Shinto   Values/Beliefs  Spiritual Requests During Hospitalization: I gave Rodri a blessing while she was sleeping.     Addressed Needs/Concerns and/or Carlos Through:          Outcome:        Advance Directives:         Spiritual Care Annotation    Annotation:  I gave Rodri a blessing while she was sleeping today.  Kee Rodriguez

## 2025-08-09 NOTE — ASSESSMENT & PLAN NOTE
Start Rocephin 1 g IV piggyback daily  Follow-up urine culture  Noted to have history of recurrent UTIs

## 2025-08-09 NOTE — PROGRESS NOTES
".Physical Therapy    Physical Therapy Evaluation    Patient Name: Karey Oakley \"mick"  MRN: [id number]  Department: [facility] 3 E  Room: 04/04  Today's Date: 8/9/2025   Time Calculation  Start Time: 0705  Stop Time: 0730  Time Calculation (min): 25 min    Assessment/Plan   PT Assessment  PT Assessment Results: Decreased strength, Decreased range of motion, Decreased endurance, Impaired balance, Decreased mobility, Decreased coordination, Decreased cognition, Impaired judgement, Decreased safety awareness, Impaired sensation  Rehab Prognosis: Good  Barriers to Discharge Home: Physical needs  Evaluation/Treatment Tolerance: Patient limited by fatigue  Medical Staff Made Aware: Yes  End of Session Communication: Bedside nurse  Assessment Comment: Patient presents with decreaed functional mobility/activity tolerance with R ning-paresis, global aphasia: expressive > receptive.  Requires heavy assist x 2 for mobility.  Recommend high intensity rehab at discharge  End of Session Patient Position: Bed, 3 rail up, Alarm on  IP OR SWING BED PT PLAN  Inpatient or Swing Bed: Inpatient  PT Plan  Treatment/Interventions: Bed mobility, Transfer training, Gait training, Stair training, Balance training, Neuromuscular re-education, Neurodevelopmental intervention, Strengthening, Endurance training, Range of motion, Therapeutic exercise, Therapeutic activity, Home exercise program, Positioning, Postural re-education  PT Plan: Ongoing PT  PT Frequency: Daily  PT Discharge Recommendations: High intensity level of continued care  Equipment Recommended upon Discharge: Wheeled walker  PT Recommended Transfer Status: Assist x2, Total assist  PT - OK to Discharge: Yes    Subjective     Past Medical History        Medical History        Past Medical History:   Diagnosis Date    Personal history of other diseases of the circulatory system       History of hypertension    Personal history of other endocrine, nutritional and metabolic disease   "     History of hypercholesterolemia    Personal history of other endocrine, nutritional and metabolic disease       History of diabetes mellitus          Acute colitis 03/09/2022    Acute pulmonary embolism without acute cor pulmonale (HCC)      Complication of anesthesia       N&V    Displacement of lumbar intervertebral disc without myelopathy 10/06/2017     Added automatically from request for surgery [id number]      High cholesterol      Hypertension      Kidney stones       chronic bladder infections    Migraines      Post-COVID syndrome      Snoring                 Surgical History        Surgical History         Past Surgical History:   Procedure Laterality Date    TOTAL KNEE ARTHROPLASTY   09/25/2013     Total Knee Arthroplasty            ARTHRP KNE CONDYLE&PLATU MEDIAL&LAT COMPARTMENTS     2000s     bilateral    CATARACT EXTRACTION HX  Right  10/2017    COLONOSCOPY     x2     tortuouis colon    EXT HYSTERECTOMY,W/PARTIAL VAGINECTO           ovaries remain, endometriosis and bleeding    LIG/TRNSXJ FLP TUBE ABDL/VAG APPR UNI/BI          NEUROPLASTY &/TRANSPOS MEDIAN NRV CARPAL TUNNE           Carpal tunnel decomp jamie    REMOVE CATARACT, INSERT LENS, INTRACAPSUL  Left  04/17/2018    PT Visit Info:  PT Received On: 08/09/25  General Visit Information:  General  Reason for Referral: Impaired mobility  Referred By: Serjio Rangel MD  Past Medical History Relevant to Rehab: 81 y/o F admitted after being found on floor.  Disoriented and non-verbal initially with R sided weakness.  Family/Caregiver Present: No  Prior to Session Communication: Bedside nurse  Patient Position Received: Bed, 3 rail up, Alarm on  Preferred Learning Style: visual  General Comment: Presents with expressive > receptive global aphasia.  Follows commands 90% of time.  RN cleared for PT evaluation.  Pt. agreeable.  Home Living:  Home Living  Home Adaptive Equipment: Walker rolling or standard  Home Living Comments: Unable to provide info due to  "expressive aphaia.  Pt. does shake \"yes\" to using a walker and living alone.  Questionable historian.  Will need to validate  Prior Level of Function:  Prior Function Per Pt/Caregiver Report  Level of [place]: Independent with ADLs and functional transfers, Independent with homemaking with ambulation  Precautions:  Precautions  Medical Precautions: Fall precautions  Precautions Comment: Expressive aphaisa      Date/Time Vitals Session Patient Position Pulse Resp SpO2 BP MAP (mmHg)    08/09/25 0800 --  --  54  --  --  --  --            Objective   Pain:  Pain Assessment  Pain Assessment: 0-10  0-10 (Numeric) Pain Score: 0 - No pain  Cognition:  Cognition  Overall Cognitive Status: Impaired  Orientation Level: Disoriented to time  Safety/Judgement: Exceptions to WFL  Complex Functional Tasks: Moderate  Planning: Reduced planning skills (Impaired motor planning with transfers/mobility)    General Assessments:                  Activity Tolerance  Endurance: Decreased tolerance for upright activites  Activity Tolerance Comments: Limited by weakness    Sensation  Light Touch: Severe deficits in the RLE, Severe deficits in the LLE  Sensation Comment: Difficulty to assess due to aphasia.  Decreased light touch accuracy B/L    Strength  Strength Comments: 3+/5 RLE, 4-/5 LLE  Strength  Strength Comments: 3+/5 RLE, 4-/5 LLE    Perception  Inattention/Neglect: Cues to attend to right side of body  Initiation: Hand over hand to initiate tasks  Motor Planning: Hand over hand to sequence tasks  Perseveration: Not present      Coordination  Movements are Fluid and Coordinated: No  Lower Body Coordination: Mild ataxia B/L R > L LLE with movement assess  Trunk Coordination: Poor, retropulsive in sitting  Heel to Shin: Impaired  Coordination Comment: B/L impairments RLE > LLE    Postural Control  Postural Control: Impaired  Trunk Control: Impaired    Static Sitting Balance  Static Sitting-Level of Assistance: Minimum " assistance    Static Standing Balance  Static Standing-Level of Assistance: Dependent  Static Standing-Comment/Number of Minutes: Dependent assist x 2 for sit to stand x 2 trials.  Impaired motor planning.  Retropulisve with posterior LOB.  Unable to maintain COM over B/L LE's  Functional Assessments:  Bed Mobility  Bed Mobility: Yes  Bed Mobility 1  Bed Mobility 1: Supine to sitting, Sitting to supine  Level of Assistance 1: Moderate assistance  Bed Mobility Comments 1: Moderate assist to trunk for supine to sit.  Pt. able to pull to sit with B/L UE's.  Mobilizes LE's out of bed.  Mod A to B/L LE's for sit to supine    Transfers  Transfer: Yes  Transfer 1  Transfer From 1: Sit to, Stand to  Transfer to 1: Sit  Transfer Device 1: Walker  Transfer Level of Assistance 1: Dependent  Trials/Comments 1: Dependent A x 2 for sit to stand trial blocking knees.  Pt. unable to stand full erect.  Retropulsive.    Ambulation/Gait Training  Ambulation/Gait Training Performed: No    Stairs  Stairs: No    Outcome Measures:  [facility] Basic Mobility  Turning from your back to your side while in a flat bed without using bedrails: A lot  Moving from lying on your back to sitting on the side of a flat bed without using bedrails: A lot  Moving to and from bed to chair (including a wheelchair): Total  Standing up from a chair using your arms (e.g. wheelchair or bedside chair): Total  To walk in hospital room: Total  Climbing 3-5 steps with railing: Total  Basic Mobility - Total Score: 8    Encounter Problems       Encounter Problems (Active)       Mobility       STG - Patient will ambulate 50' x 1 with use of 2WW and minimal assist.        Start:  08/09/25    Expected End:  08/23/25               PT Transfers       STG - Transfer from bed to chair with LRD at minimal level.        Start:  08/09/25    Expected End:  08/23/25            STG - Patient to transfer to and from sit to supine at close S level.        Start:  08/09/25    Expected  End:  08/23/25            STG - Patient will transfer sit to and from stand with use of 2WW at minimal assist level       Start:  08/09/25    Expected End:  08/23/25                   Education Documentation  Precautions, taught by Jerson Castro, PT at 8/9/2025  8:07 AM.  Learner: Patient  Readiness: Acceptance  Method: Explanation, Teach-back, Demonstration  Response: Needs Reinforcement    Mobility Training, taught by Jerson Castro, PT at 8/9/2025  8:07 AM.  Learner: Patient  Readiness: Acceptance  Method: Explanation, Teach-back, Demonstration  Response: Needs Reinforcement    Education Comments  No comments found.

## 2025-08-09 NOTE — ASSESSMENT & PLAN NOTE
Incidental finding on CT angio head and neck of the following:  Multifocal ground-glass opacities both lungs. Findings could be  related to edema or infection.  I have added on a procalcitonin level in the a.m. 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

## 2025-08-09 NOTE — H&P
"        Patient: Karey Oakley : 1945   MRN: [id number]  Admission Date: 2025  Inpatient Service: Hospitalist   PCP: Shea Tinajero MD  Code Status: Full Code      Chief Complaint: Cerebrovascular Accident (Per EMS pt found down on ground by family. Pt unable to answer questions, pt has right sided weakness. Unknown LKW. Pt arrived with c-collar in place. Pt not following commands, pt unable to answer questions)          History Of Present Illness      Karey Oakley \"marilyn\" is a 80 y.o. female presenting with Chief Complaint of  Altered Mental Status.  Patient presents to [facility] ED via EMS after she was found down on the floor at home by a family member.  Patient unable to produce any speech and she was found to have right-sided weakness.  Unknown last known well.  Patient arrived in c-collar.  Patient unable to follow commands.  Initial NIH score in the ED was 19 by ED provider.      Patient lives alone and family spoke to her at 7 pm yesterday  As she was not answering her phone today, they came to check on her and found her on the floor at 4 pm.   Unclear symptom onset.  Nonverbal with impaired comprehension.       Patient seen and examined on the stepdown unit.  Patient's son was present during my encounter.  He confirms that his mother was last known well yesterday evening via telephone.  He states that his mother uses a walker at baseline.  He states that his mother lives alone.  His mother is no longer driving.  He confirms that his mother would not have been on a blood thinner called Eliquis recently.  There is a possibility it was discontinued secondary to epistaxis.      Upon my evaluation the patient was able to slowly tell me her first name although she stuttered.  She can say 1 word answers but she is incoherent.  She is aphasic.  Spontaneously moving her right upper and right lower extremity.  She has good strength right upper extremity and diminished strength right " lower extremity.  She appears to be improving as per the son and RN.        Upon reassessment by stroke neurology a NIH score was calculated as 10.    Case discussed with ED provider.      Vital Signs in the ED:        8/8/2025     7:45 PM 8/8/2025     8:00 PM 8/8/2025     8:15 PM 8/8/2025     8:30 PM 8/8/2025     8:45 PM 8/8/2025     9:00 PM 8/8/2025     9:15 PM   Vitals   Systolic   134 134  130    Diastolic   108 52  52    Heart Rate 66 54 67 66 66 67 61   Resp 16 14 14 20 21 18 21         ED Diagnostic Work-Up Noted for:    CBC with differential with normal WBC count of 8.8.  H&H was stable at 12.3/37.3, respectively.  Patient's platelet count was stable at 403.  Patient's blood chemistry noted for an elevated glucose 107.  The BUN and creatinine were 18/1.34, respectively.  No electrolyte abnormalities noted.  Patient's magnesium and BNP were within normal range.  Sets of cardiac enzymes were flat at 7 and 6, respectively.  A coagulation profile for the patient was noted for an INR of 1.1 followed by 11.4.  Patient's VBG in the ED was essentially noted to be within normal range except for a low PO2 of 34.  The pH was normal at 7.41.  Urinalysis for the patient was suspicious for infection with positive leuk esterase and pyorrhea.  Urine cultures pending.  A urine toxicology screen for the patient was pan negative.  Patient underwent imaging test in the ED.  Patient obtain a CT head without IV contrast.  Patient obtained a CT cervical spine without IV contrast.  Patient obtained a CT angio head and neck in the ED.  There is a pending CT brain attack perfusion study.  Patient obtained a portable chest x-ray in the ED.  Results noted for the following:    IMPRESSION:  A hyperdense left MCA sign is suspected. Further evaluation with CT  angiogram and/or dedicated brain MRI/MRA is recommended.      Chronic changes as noted above.      IMPRESSION:  No evidence for an acute fracture or subluxation of the  cervical  spine. Mild degenerative changes from C4-5 to C6-7.  Vertebral bodies appear demineralized.    IMPRESSION:  1. Abrupt cut off distal M1 segment left middle cerebral artery,  presumed thrombosis, with attenuated arborization of the left middle  cerebral artery.  2. No flow-limiting stenosis or significant plaque irregularity in  the neck.  3. Multifocal ground-glass opacities both lungs. Findings could be  related to edema or infection.      Of note a 2D echocardiogram from January 18, 2021 was noted for the following:    The left ventricular chamber size is normal.  Mild concentric Left ventricle hypertrophy.  There is normal left ventricular systolic function.  Estimated ejection fraction is >60%.  The left atrial size is mild to moderately dilated.  No evidence of aortic regurgitation.  Trace to mild mitral regurgitation.  Mild tricuspid regurgitation.      EKG in the ED noted for:      EKG in the ED today noted for sinus bradycardia at 56 bpm  Left ventricular hypertrophy with QRS widening  Qtc 449 ms        ED Medications Administered noted for:                 Past Medical History      Past Medical History:   Diagnosis Date    Personal history of other diseases of the circulatory system     History of hypertension    Personal history of other endocrine, nutritional and metabolic disease     History of hypercholesterolemia    Personal history of other endocrine, nutritional and metabolic disease     History of diabetes mellitus      Acute colitis 03/09/2022    Acute pulmonary embolism without acute cor pulmonale (HCC)      Complication of anesthesia       N&V    Displacement of lumbar intervertebral disc without myelopathy 10/06/2017     Added automatically from request for surgery [id number]      High cholesterol      Hypertension      Kidney stones       chronic bladder infections    Migraines      Post-COVID syndrome      Snoring            Surgical History      Past Surgical History:   Procedure  Laterality Date    TOTAL KNEE ARTHROPLASTY  09/25/2013    Total Knee Arthroplasty        ARTHRP KNE CONDYLE&PLATU MEDIAL&LAT COMPARTMENTS     2000s     bilateral    CATARACT EXTRACTION HX  Right  10/2017    COLONOSCOPY     x2     tortuouis colon    EXT HYSTERECTOMY,W/PARTIAL VAGINECTO           ovaries remain, endometriosis and bleeding    LIG/TRNSXJ FLP TUBE ABDL/VAG APPR UNI/BI          NEUROPLASTY &/TRANSPOS MEDIAN NRV CARPAL TUNNE           Carpal tunnel decomp jamie    REMOVE CATARACT, INSERT LENS, INTRACAPSUL  Left  04/17/2018          Social History      She has no history on file for tobacco use, alcohol use, and drug use.        Family History      Family History[1]         Allergies      Patient has no known allergies.        Review of Systems          14-point ROS otherwise negative, as per HPI/Interval History.    General: No change in weight. No to weakness. No Fevers/Chills/Night Sweats   Skin: No skin/hair/nail changes. No rashes or sores.  Head:  No trauma. No Headache/nasuea/vomitting.   Eyes: No visual changes. No tearing. No itching.   Ears: No hearing loss. No tinnitus. No vertigo. No discharge.  Nose, Sinuses: No rhinorrhea, No nasal congestion. No epistaxis.  Mouth, Throat, Neck: No bleeding gums, hoarseness, sore throat or swollen neck  Cardiac: No palpitations. No GREWAL. No PND. No Orthopnea.   Respiratory: No Shortness of Breath. No wheezing. No cough. No hemoptysis.   GI: No nausea/vomiting. No indigestion. No diarrhea. No constipation.   Extremities: No numbness or tingling. No paresthesias.   Urinary: No change in urinary frequency. No change in hesitancy. No hematuria. No incontinence.         Physical Exam      Constitutional:  Pleasant  Eyes: PERRL, EOMI,   ENMT: mucous membranes moist  Head/Neck: Neck supple, No JVD,   Respiratory/Thorax: Patent airways, CTAB,   Cardiovascular: Regular, rate and rhythm, no murmurs  Gastrointestinal: Soft, non-distended, +BS.  Musculoskeletal: ROM intact,  "no joint swelling, normal strength  Extremities: peripheral pulses intact; no edema  Neurological: Awake, alert.  Starting to verbalize with single words mimics commands.  Symmetric face.   Do not appreciate any bilateral upper extremity pronator drift.  She can hold both of her arms up against gravity.  He has mild paresis right lower and upper extremity.  Psychological: Appropriate mood and behavior  Skin: No lesions, No rashes.         Last Recorded Vitals  Blood pressure 130/52, pulse 61, temperature 36.4 °C (97.5 °F), temperature source Oral, resp. rate (!) 21, height 1.6 m (5' 3\"), weight 84.4 kg (186 lb 1.1 oz), SpO2 96%.      Labs:  Estimated Creatinine Clearance: 34.5 mL/min (A) (by C-G formula based on SCr of 1.34 mg/dL (H)).-  No components found for: \"HBA1C\"  No results found for: \"TSH\"  Recent Results (from the past 24 hours)   Cardiac Enzymes - CPK    Collection Time: 08/08/25  5:24 PM   Result Value Ref Range    Creatine Kinase 29 0 - 215 U/L   CBC and Auto Differential    Collection Time: 08/08/25  5:24 PM   Result Value Ref Range    WBC 8.8 4.4 - 11.3 x10*3/uL    nRBC 0.2 (H) 0.0 - 0.0 /100 WBCs    RBC 4.20 4.00 - 5.20 x10*6/uL    Hemoglobin 12.3 12.0 - 16.0 g/dL    Hematocrit 37.3 36.0 - 46.0 %    MCV 89 80 - 100 fL    MCH 29.3 26.0 - 34.0 pg    MCHC 33.0 32.0 - 36.0 g/dL    RDW 14.9 (H) 11.5 - 14.5 %    Platelets 403 150 - 450 x10*3/uL    Neutrophils % 68.0 40.0 - 80.0 %    Immature Granulocytes %, Automated 0.7 0.0 - 0.9 %    Lymphocytes % 23.2 13.0 - 44.0 %    Monocytes % 5.2 2.0 - 10.0 %    Eosinophils % 2.6 0.0 - 6.0 %    Basophils % 0.3 0.0 - 2.0 %    Neutrophils Absolute 5.97 (H) 1.60 - 5.50 x10*3/uL    Immature Granulocytes Absolute, Automated 0.06 0.00 - 0.50 x10*3/uL    Lymphocytes Absolute 2.04 0.80 - 3.00 x10*3/uL    Monocytes Absolute 0.46 0.05 - 0.80 x10*3/uL    Eosinophils Absolute 0.23 0.00 - 0.40 x10*3/uL    Basophils Absolute 0.03 0.00 - 0.10 x10*3/uL   Phosphorus    Collection " Time: 08/08/25  5:24 PM   Result Value Ref Range    Phosphorus 3.1 2.5 - 4.9 mg/dL   Magnesium    Collection Time: 08/08/25  5:24 PM   Result Value Ref Range    Magnesium 1.67 1.60 - 2.40 mg/dL   Comprehensive metabolic panel    Collection Time: 08/08/25  5:24 PM   Result Value Ref Range    Glucose 107 (H) 74 - 99 mg/dL    Sodium 137 136 - 145 mmol/L    Potassium 4.0 3.5 - 5.3 mmol/L    Chloride 102 98 - 107 mmol/L    Bicarbonate 26 21 - 32 mmol/L    Anion Gap 13 10 - 20 mmol/L    Urea Nitrogen 18 6 - 23 mg/dL    Creatinine 1.34 (H) 0.50 - 1.05 mg/dL    eGFR 40 (L) >60 mL/min/1.73m*2    Calcium 9.4 8.6 - 10.3 mg/dL    Albumin 4.4 3.4 - 5.0 g/dL    Alkaline Phosphatase 78 33 - 136 U/L    Total Protein 6.9 6.4 - 8.2 g/dL    AST 15 9 - 39 U/L    Bilirubin, Total 0.5 0.0 - 1.2 mg/dL    ALT 8 7 - 45 U/L   Blood Gas Venous Full Panel    Collection Time: 08/08/25  5:24 PM   Result Value Ref Range    POCT pH, Venous 7.41 7.33 - 7.43 pH    POCT pCO2, Venous 43 41 - 51 mm Hg    POCT pO2, Venous 34 (L) 35 - 45 mm Hg    POCT SO2, Venous 63 45 - 75 %    POCT Oxy Hemoglobin, Venous 61.4 45.0 - 75.0 %    POCT Hematocrit Calculated, Venous 38.0 36.0 - 46.0 %    POCT Sodium, Venous 135 (L) 136 - 145 mmol/L    POCT Potassium, Venous 4.0 3.5 - 5.3 mmol/L    POCT Chloride, Venous 100 98 - 107 mmol/L    POCT Ionized Calicum, Venous 1.20 1.10 - 1.33 mmol/L    POCT Glucose, Venous 114 (H) 74 - 99 mg/dL    POCT Lactate, Venous 0.8 0.4 - 2.0 mmol/L    POCT Base Excess, Venous 2.3 -2.0 - 3.0 mmol/L    POCT HCO3 Calculated, Venous 27.3 (H) 22.0 - 26.0 mmol/L    POCT Hemoglobin, Venous 12.8 12.0 - 16.0 g/dL    POCT Anion Gap, Venous 12.0 10.0 - 25.0 mmol/L    Patient Temperature 37.0 degrees Celsius    FiO2 95 %   B-Type Natriuretic Peptide    Collection Time: 08/08/25  5:24 PM   Result Value Ref Range    BNP 87 0 - 99 pg/mL   Protime-INR    Collection Time: 08/08/25  5:24 PM   Result Value Ref Range    Protime 11.4 9.3 - 12.7 seconds    INR  1.1 0.9 - 1.2   Ammonia    Collection Time: 08/08/25  5:24 PM   Result Value Ref Range    Ammonia 27 16 - 53 umol/L   Ethanol    Collection Time: 08/08/25  5:24 PM   Result Value Ref Range    Alcohol <10 <=10 mg/dL   Troponin I, High Sensitivity, Initial    Collection Time: 08/08/25  5:24 PM   Result Value Ref Range    Troponin I, High Sensitivity 7 0 - 13 ng/L   Troponin, High Sensitivity, 1 Hour    Collection Time: 08/08/25  6:19 PM   Result Value Ref Range    Troponin I, High Sensitivity 6 0 - 13 ng/L   Drug Screen, Urine    Collection Time: 08/08/25  7:24 PM   Result Value Ref Range    Amphetamine Screen, Urine Presumptive Negative Presumptive Negative    Barbiturate Screen, Urine Presumptive Negative Presumptive Negative    Benzodiazepines Screen, Urine Presumptive Negative Presumptive Negative    Cannabinoid Screen, Urine Presumptive Negative Presumptive Negative    Cocaine Metabolite Screen, Urine Presumptive Negative Presumptive Negative    Fentanyl Screen, Urine Presumptive Negative Presumptive Negative    Opiate Screen, Urine Presumptive Negative Presumptive Negative    Oxycodone Screen, Urine Presumptive Negative Presumptive Negative    PCP Screen, Urine Presumptive Negative Presumptive Negative    Methadone Screen, Urine Presumptive Negative Presumptive Negative   Urinalysis with Reflex Culture and Microscopic    Collection Time: 08/08/25  7:24 PM   Result Value Ref Range    Color, Urine Colorless (N) Light-Yellow, Yellow, Dark-Yellow    Appearance, Urine Clear Clear    Specific Gravity, Urine 1.011 1.005 - 1.035    pH, Urine 6.5 5.0, 5.5, 6.0, 6.5, 7.0, 7.5, 8.0    Protein, Urine NEGATIVE NEGATIVE, 10 (TRACE), 20 (TRACE) mg/dL    Glucose, Urine Normal Normal mg/dL    Blood, Urine NEGATIVE NEGATIVE mg/dL    Ketones, Urine NEGATIVE NEGATIVE mg/dL    Bilirubin, Urine NEGATIVE NEGATIVE mg/dL    Urobilinogen, Urine Normal Normal mg/dL    Nitrite, Urine NEGATIVE NEGATIVE    Leukocyte Esterase, Urine 25 Sarai/uL  (A) NEGATIVE   Microscopic Only, Urine    Collection Time: 08/08/25  7:24 PM   Result Value Ref Range    WBC, Urine 6-10 (A) 1-5, NONE /HPF    RBC, Urine 1-2 NONE, 1-2, 3-5 /HPF    Squamous Epithelial Cells, Urine 1-9 (SPARSE) Reference range not established. /HPF    Bacteria, Urine 1+ (A) NONE SEEN /HPF    Hyaline Casts, Urine 1+ (A) NONE /LPF     [unfilled]      IMAGING:  [unfilled]      EKG/ECHO:  No results found for this or any previous visit (from the past 4464 hours).            Relevant Results    Lab Results   Component Value Date    WBC 8.8 08/08/2025    HGB 12.3 08/08/2025    HCT 37.3 08/08/2025    MCV 89 08/08/2025     08/08/2025       Lab Results   Component Value Date    GLUCOSE 107 (H) 08/08/2025    CALCIUM 9.4 08/08/2025     08/08/2025    K 4.0 08/08/2025    CO2 26 08/08/2025     08/08/2025    BUN 18 08/08/2025    CREATININE 1.34 (H) 08/08/2025       Lab Results   Component Value Date    HGBA1C 5.4 03/03/2025         CT head wo IV contrast  Result Date: 8/8/2025  Interpreted By:  Jacqui Hedrick, STUDY: CT HEAD WO IV CONTRAST;  8/8/2025 5:18 pm   INDICATION: Signs/Symptoms:AMS.   COMPARISON: CT scan of the head 06/24/2019   ACCESSION NUMBER(S): [id number]   ORDERING CLINICIAN: MARTY LEJEUNE   TECHNIQUE: Axial noncontrast CT images of the head.   FINDINGS: BRAIN PARENCHYMA: A hyperdense left MCA sign is suspected. Gray-white matter interfaces are preserved. No mass, mass effect or midline shift. Mild to moderate deep and periventricular white matter hypodensities are nonspecific, but favored to represent chronic small vessel ischemic changes.   HEMORRHAGE: No acute intracranial hemorrhage. VENTRICLES and EXTRA-AXIAL SPACES: Mild volume loss with prominence of the ventricles and sulci.. EXTRACRANIAL SOFT TISSUES: Within normal limits. PARANASAL SINUSES/MASTOIDS: Mild opacification of several ethmoid air cells. The visualized paranasal sinuses and mastoid air cells are aerated.  CALVARIUM: No depressed skull fracture. Stable osteomas along the right parietal bone..   OTHER FINDINGS: Atherosclerotic calcification of the carotid siphons and vertebral arteries..       A hyperdense left MCA sign is suspected. Further evaluation with CT angiogram and/or dedicated brain MRI/MRA is recommended.   Chronic changes as noted above.   MACRO: Jacqui Hedrick discussed the significance and urgency of this critical finding by telephone with  MARTY LEJEUNE on 8/8/2025 at 5:51 pm. (**-RCF-**) Findings:  See findings.   Signed by: Jacqui Hedrick 8/8/2025 5:53 PM Dictation workstation:   [id number]    CT head wo IV contrast  Result Date: 6/18/2025  * * *Final Report* * * DATE OF EXAM: Jun 18 2025  7:20PM   MYC   0504  -  CT BRAIN WO IVCON   / ACCESSION #  [id number] PROCEDURE REASON: tremors      * * * * Physician Interpretation * * * *  EXAMINATION: CT BRAIN WO IVCON CLINICAL HISTORY: Tremors. TECHNIQUE:  Serial axial images without IV contrast were obtained from the vertex to the foramen magnum. MQ:  CTBWO_3 CT Radiation dose: Integrated Dose-Length Product (DLP) for this visit = 780  mGy*cm CT Dose Reduction Employed: Automated exposure control (AEC) COMPARISON: CT dated 05/21/2025. FINDINGS: There is no abnormal extra-axial fluid, intracranial hemorrhage, mass, mass effect, or midline shift.  The ventricular system is unremarkable.   Cisterns are patent.  Gray-white differentiation is maintained.  Patchy bilateral deep white matter hypodensities not significant changed compared to prior.  Redemonstrated atherosclerosis of the carotid siphons.  Paranasal sinuses and mastoid air cells are unopacified.   Calvarium is intact.    IMPRESSION: No acute intracranial findings. : PSCB   Transcribe Date/Time: Jun 18 2025  8:38P Dictated by : MACARIO FERMIN MD This examination was interpreted and the report reviewed and electronically signed by: MACARIO FERMIN MD on Jun 18 2025  8:53PM  EST    CT head wo  IV contrast  Result Date: 5/21/2025  * * *Final Report* * * DATE OF EXAM: May 21 2025  4:33PM   MYC   0504  -  CT BRAIN WO IVCON   / ACCESSION #  [id number] PROCEDURE REASON: Mental status change, unknown cause      * * * * Physician Interpretation * * * *  EXAMINATION:  CT BRAIN WO IVCON CLINICAL HISTORY:  Mental status change, unknown cause  Patient has confusion today last well known yesterday  family not present in house and patient thought child hiding in bedroom TECHNIQUE:  Serial axial images without IV contrast were obtained from the vertex to the foramen magnum. MQ:  CTBWO_3 CT Dose-Length Product (DLP): 869  mGy*cm CT Dose Reduction Employed: Iterative reconstruction. COMPARISON:  None. RESULT: Post-operative change:  None. Acute change:   No evidence of an acute infarct or other acute parenchymal process. Hemorrhage:    No evidence of acute intracranial hemorrhage. Mass Lesion / Mass Effect:   There is no evidence of an intracranial mass or extraaxial fluid collection.  No significant mass effect. Chronic change:   Patchy foci of  low attenuation coefficient are present within the supratentorial white matter which is a nonspecific finding but likely represents moderate microvascular ischemia.     Atherosclerotic calcification of the carotid and vertebral arteries. Parenchyma:  There is moderate generalized volume loss.  The brain parenchyma is otherwise within normal limits for age. Ventricles:   The ventricles are within normal limits of size and configuration for age. Paranasal sinuses and skull:  The visualized paranasal sinuses are grossly clear.    There are a few small right parietal bone outer table osteomas.  (topogram) images: No additional findings.    IMPRESSION: No acute intracranial process. _ : DORIE   Transcribe Date/Time: May 21 2025  4:54P Dictated by : MINA VEGAS MD This examination was interpreted and the report reviewed and electronically signed by:   MINA  "MD SHASTA on May 21 2025  4:55PM  EST        Scheduled medications  Scheduled Medications[2]    Continuous medications  Continuous Medications[3]    PRN medications  PRN Medications[4]    Karey Oakley \"marilyn\" is a 80 y.o. female presenting with Chief Complaint of  Altered Mental Status.  Patient presents to [facility] ED via EMS after she was found down on the floor at home by a family member.  Patient unable to produce any speech and she was found to have right-sided weakness.  Unknown last known well.  Patient arrived in c-collar.  Patient unable to follow commands.  Initial NIH score in the ED was 19 by ED provider.  Patient admitted for further evaluation and management.      Assessment/Plan   Assessment & Plan  Acute CVA (cerebrovascular accident) (Multi)  [place] [facility] [place]  Continuous telemetry pulse oximetry monitoring  CT Brain w/o and CT Brain + Neck Angio Appreciated.  1. Abrupt cut off distal M1 segment left middle cerebral artery,  presumed thrombosis, with attenuated arborization of the left middle  cerebral artery.  2. No flow-limiting stenosis or significant plaque irregularity in  the neck.  Obtain TTE with bubble  Defer Carotid Arterial Doppler Ultrasound to Neurology   MRI head, MRA brain/neck  Neurology consultation appreciate recs.  Speech and Swallow Evaluation   Permissive HTN Management  PT/OT/Rehabilitation Consult   Rectal ASA as patient is strict NPO  Start Plavix/Statin once diet advanced per Neurology   Neuro checks Q shift  Up with Assist   Aspiration Precautions   AM HgbA1c and Lipid Profile    Aphasia  Management as above    Acute UTI (urinary tract infection)  Start Rocephin 1 g IV piggyback daily  Follow-up urine culture  Noted to have history of recurrent UTIs    Chronic pulmonary embolism (Multi)  Pulmonary hypertension clinic note from July 1, 2025 [facility] stated  Presented to Dr. Joyce for evaluation of chronic PE, who noted improvement in left sided thrombotic " burden but ongoing thrombotic burden on the right and an echo with RVSP 41. Subsequent imaging showed improvement/ resolution in chronic clot burden. Referred to PH clinic for further work up. Of note, he did not recommend anticoagulation.   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 Findings could be  related to edema or infection.  I have added on a procalcitonin level in the a.m. to assess for pneumonia  Will request pulmonary consultation to help further direct care in the setting of CTEPH    (HFpEF) heart failure with preserved ejection fraction  Patient takes Lasix 10 mg p.o. daily    Type 2 diabetes mellitus  POCT every 6 hourly  Insulin sliding scale  Patient's recent SGL2Ti recently discontinued to recurrent UTI    CRF (chronic renal failure), stage 3 (moderate) (Multi)  Avoid nephrotoxic agents    MCI (mild cognitive impairment)    Anxiety and depression  Hold Wellbutrin 100 mg p.o. daily    Dyslipidemia  Patient taking Lipitor 40 mg p.o. daily at home    Essential hypertension  Continue to monitor BP and adjust antihypertensive medications accordingly    GERD (gastroesophageal reflux disease)  Start Protonix 40 mg IV Q daily until can be transition to p.o.    GI and DVT Prophylaxis  Start Protonix 40 mg IV push daily  Start heparin 5000 units subcu every 8 hourly      Advance care Directives:      FULL Code.        Disposition:  Continue to monitor inpatient, await consultant recommendations, await test results, and await clinical improvement        The patient has been Instructed by me upon admission to follow-up with their PCP upon discharge to obtain repeat blood work/CXR and to maintain close medical follow-up for their current disease  process.      This Dictation was Transcribed using a Nuance Dragon Voice Recognition System Device (with Compatible Computer + Software) and as such may contain Grammatical Errors and Unintentional Typing Misprints.          I spent 55 minutes in the professional and overall care of this patient.          Serjio Rangel MD/MPH           [1] No family history on file.  [2] aspirin, 300 mg, rectal, Nightly  cefTRIAXone, 1 g, intravenous, q24h  heparin (porcine), 5,000 Units, subcutaneous, q8h  pantoprazole, 40 mg, intravenous, Daily  perflutren lipid microspheres, 0.5-10 mL of dilution, intravenous, Once in imaging  perflutren protein A microsphere, 0.5 mL, intravenous, Once in imaging  sodium chloride, 500 mL, intravenous, Once  sulfur hexafluoride microsphr, 2 mL, intravenous, Once in imaging  [3]    [4] PRN medications: acetaminophen **OR** acetaminophen **OR** acetaminophen, dextrose, dextrose, glucagon, glucagon, labetaloL, oxygen, polyethylene glycol

## 2025-08-09 NOTE — SIGNIFICANT EVENT
Pt did not pass her swallow eval. See the NIHSS scale score of 7 at 0000 on admission.    8/9/2025 0400 See neuro check documentation.

## 2025-08-09 NOTE — PROGRESS NOTES
"Occupational Therapy    Evaluation/Treatment    Patient Name: Karey Oakley \"marilyn\"  MRN: [id number]  Department: [facility] 3 E  Room: 04/04A  Today's Date: 08/09/25  Time Calculation  Start Time: 0829  Stop Time: 0851  Time Calculation (min): 22 min       Assessment:  OT Assessment: Pt presents on eval with increased overall weakness, cognitive deficits noted, poor activity tolerance, impaired overall coordination, and poor standing balance affecting her self-care and functional transfers/mobility. Pt will benefit from continued skilled OT to address these deficits and facilitate returning to her functional baseline.  Prognosis: Good  Barriers to Discharge Home: Caregiver assistance, Cognition needs, Physical needs  Caregiver Assistance: Patient lives alone and/or does not have reliable caregiver assistance  Cognition Needs: 24hr supervision for safety awareness needed, Medication and/or medical management daily assist needed, Insight of patient limited regarding functional ability/needs, Cognition-related high falls risk  Physical Needs: Ambulating household distances limited by function/safety, 24hr mobility assistance needed, 24hr ADL assistance needed, High falls risk due to function or environment  Evaluation/Treatment Tolerance: Patient tolerated treatment well  End of Session Communication: Bedside nurse  End of Session Patient Position: Bed, 3 rail up, Alarm on (Needs in reach.)  OT Assessment Results: Decreased ADL status, Decreased upper extremity strength, Decreased safe judgment during ADL, Decreased cognition, Decreased endurance, Decreased fine motor control, Decreased functional mobility, Decreased gross motor control, Decreased IADLs, Decreased trunk control for functional activities  Strengths: Premorbid level of function, Support of extended family/friends  Barriers to Participation: Comorbidities    Plan:  Treatment Interventions: ADL retraining, Functional transfer training, UE strengthening/ROM, " Endurance training, Cognitive reorientation, Patient/family training, Equipment evaluation/education, Neuromuscular reeducation, Fine motor coordination activities, Compensatory technique education  OT Frequency: 5 times per week (during this acute inpatient hospitalization)  OT Discharge Recommendations: High intensity level of continued care, Other (Comment) (Based on current functional status and rehab potential, patient is anticipated to tolerate and benefit from 5 or more days per week of skilled rehabilitative therapy after discharge from this acute inpatient hospitalization.)  Equipment Recommended upon Discharge: Wheeled walker  OT Recommended Transfer Status: Moderate assist  OT - OK to Discharge: Yes      Subjective     OT Visit Info:  OT Received On: 08/09/25    General Visit Info:   General  Reason for Referral: acute CVA, impaired ADL's/mobility  Referred By: Serjio Rangel MD  Past Medical History Relevant to Rehab: anxiety, dep, a-fib, LBP, lumbar spinal stenosis, mild cognitive impairment  Family/Caregiver Present: No  Prior to Session Communication: Bedside nurse  Patient Position Received: Bed, 3 rail up, Alarm on  General Comment: Pt is an 79 yo female admitted to the hospital with global aphasia and R sided weakness. Pt diagnosed with an acute CVA.     Precautions:  Hearing/Visual Limitations: pt reports wearing glasses and bilateral hearing aids  Medical Precautions: Fall precautions (aspiration precautions)  Precautions Comment: expressive aphasia>receptive aphasia, dysarthria    Vital Signs Comment: HR 78, pulse ox 96%, /66(87)     Pain:  Pain Assessment  Pain Assessment: FLACC (Face, Legs, Activity, Cry, Consolability)  0-10 (Numeric) Pain Score: 0 - No pain    Objective     Cognition:  Overall Cognitive Status: Impaired  Orientation Level: Disoriented to time  Following Commands: Follows one step commands with increased time  Problem Solving: Assistance required to generate  solutions  Cognition Comments: Pt has a h/o mild cognitive impairment and appears to have worsening symptoms post CVA with expressive aphasia, mild receptive aphasia, and dysarthria.  Planning: Reduced planning skills  Processing Speed: Delayed    Home Living:  Lives With: Alone  Home Adaptive Equipment: Walker rolling or standard  Home Living Comments: Pt is a poor historian due to global aphasia and dysarthria. Only limited home info. at this time in chart.    Prior Function:  Level of [place]: Independent with ADLs and functional transfers, Independent with homemaking with ambulation (projected)  Receives Help From: Family (2 sons close by per pt.)  ADL Assistance: Independent (projected)  Homemaking Assistance: Independent (projected)  Ambulatory Assistance: Independent (projected using RW?)  Vocational: Retired  Hand Dominance: Right  Prior Function Comments: Pt is a poor historian for PLOF d/t global aphasia and dysarthria.    ADL:  Eating Assistance: Minimal  Eating Deficit: Setup, Verbal cueing, Supervision/safety, Increased time to complete, Bringing food to mouth assist (projected)  Grooming Assistance: Moderate  Grooming Deficit: Steadying, Verbal cueing, Standing with assistive device  Bathing Assistance: Moderate  UE Dressing Assistance: Moderate  LE Dressing Assistance: Total  LE Dressing Deficit: Don/doff R sock, Don/doff L sock (sitting EOB)  Toileting Assistance with Device: Total  Toileting Deficit: Other (Comment) (purewick in place)    Activity Tolerance:  Activity Tolerance Comments: Poor+ to Fair-    Bed Mobility/Transfers: Bed Mobility  Bed Mobility:  (Pt completed supine to sit in bed with close S and increased time to complete. Pt completed sit to supine with mod A to lift her BLE's into bed.)    Transfers  Transfer:  (Pt completed sit<>stand with mod A to initiate standing, for balance, and for controlled descent.)    Functional Mobility:  Functional Mobility  Functional Mobility  Performed:  (Pt able to take lateral steps towards the HOB with mod A(arm in arm assist) for balance/safety and verbal cues for sequencing.)    Sitting Balance:  Static Sitting Balance  Static Sitting-Balance Support: Bilateral upper extremity supported, Feet supported  Static Sitting-Level of Assistance: Close supervision    Standing Balance:  Static Standing Balance  Static Standing-Balance Support: Left upper extremity supported  Static Standing-Level of Assistance: Moderate assistance    Therapy/Activity: Therapeutic Activity  Therapeutic Activity Performed:  (See bed mobility, transfers, functional mobility, and static sitting/standing balance activity with verbal cues throughout.)    Sensation:  Sensation Comment: Not formally assessed    Strength:  Strength Comments: BUE's 3+/5(appear equally weak via MMT)    Coordination:  Movements are Fluid and Coordinated: No  Upper Body Coordination: BUE's with mild deficits and tremulous     Hand Function:  Hand Function  Gross Grasp: Impaired (delayed release)  Coordination: Impaired (mild deficits)      Outcome Measures: [facility] Daily Activity  Putting on and taking off regular lower body clothing: Total  Bathing (including washing, rinsing, drying): A lot  Putting on and taking off regular upper body clothing: A lot  Toileting, which includes using toilet, bedpan or urinal: Total  Taking care of personal grooming such as brushing teeth: A lot  Eating Meals: A little  Daily Activity - Total Score: 11    Education Documentation  Body Mechanics, taught by Luis Figueroa Jr., OT at 8/9/2025 10:10 AM.  Learner: Patient  Readiness: Acceptance  Method: Explanation  Response: Needs Reinforcement  Comment: Education and verbal cues provided throughout.    Home Exercise Program, taught by Luis Figueroa Jr., OT at 8/9/2025 10:10 AM.  Learner: Patient  Readiness: Acceptance  Method: Explanation  Response: Needs Reinforcement  Comment: Education and verbal cues provided  throughout.    ADL Training, taught by Luis Figueroa Jr., OT at 8/9/2025 10:10 AM.  Learner: Patient  Readiness: Acceptance  Method: Explanation  Response: Needs Reinforcement  Comment: Education and verbal cues provided throughout.    Education Comments  No comments found.    Goals:  Encounter Problems       Encounter Problems (Active)       OT Goals       Pt will complete self-feeding with mod indep. (Progressing)       Start:  08/09/25    Expected End:  09/09/25            Pt will complete all grooming tasks with CGA in standing with walker. (Progressing)       Start:  08/09/25    Expected End:  09/09/25            Pt will complete UB dressing/bathing with setup and LB dressing/bathing with min A using adaptive equipment as needed.  (Progressing)       Start:  08/09/25    Expected End:  09/09/25            Pt will complete all toileting tasks with CGA. (Progressing)       Start:  08/09/25    Expected End:  09/09/25            Pt will complete all functional transfers and mobility with CGA using a RW. (Progressing)       Start:  08/09/25    Expected End:  09/09/25

## 2025-08-09 NOTE — CARE PLAN
Problem: Skin  Goal: Decreased wound size/increased tissue granulation at next dressing change  8/9/2025 1410 by Yennifer Bourgeois RN  Flowsheets (Taken 8/9/2025 1410)  Decreased wound size/increased tissue granulation at next dressing change: Protective dressings over bony prominences  8/9/2025 1410 by Yennifer Bourgeois RN  Outcome: Progressing  Flowsheets (Taken 8/9/2025 1410)  Decreased wound size/increased tissue granulation at next dressing change: Protective dressings over bony prominences  Goal: Participates in plan/prevention/treatment measures  Outcome: Progressing  Goal: Prevent/manage excess moisture  Outcome: Progressing  Goal: Prevent/minimize sheer/friction injuries  Outcome: Progressing  Goal: Promote/optimize nutrition  Outcome: Progressing  Goal: Promote skin healing  Outcome: Progressing   The patient's goals for the shift include Unable    The clinical goals for the shift include Remain free of injury    Over the shift, the patient did not make progress toward the following goals. Barriers to progression include  Recommendations to address these barriers include

## 2025-08-09 NOTE — ASSESSMENT & PLAN NOTE
POCT every 6 hourly  Insulin sliding scale  Patient's recent SGL2Ti recently discontinued to recurrent UTI

## 2025-08-09 NOTE — ASSESSMENT & PLAN NOTE
Admitted to St[facility] unit  Continuous telemetry pulse oximetry monitoring  CT Brain w/o and CT Brain + Neck Angio Appreciated.  1. Abrupt cut off distal M1 segment left middle cerebral artery,  presumed thrombosis, with attenuated arborization of the left middle  cerebral artery.  2. No flow-limiting stenosis or significant plaque irregularity in  the neck.  Obtain TTE with bubble  Defer Carotid Arterial Doppler Ultrasound to Neurology   MRI head, MRA brain/neck  Neurology consultation appreciate recs.  Speech and Swallow Evaluation   Permissive HTN Management  PT/OT/Rehabilitation Consult   Rectal ASA as patient is strict NPO  Start Plavix/Statin once diet advanced per Neurology   Neuro checks Q shift  Up with Assist   Aspiration Precautions   AM HgbA1c and Lipid Profile

## 2025-08-09 NOTE — CARE PLAN
The patient's goals for the shift include Unable    The clinical goals for the shift include Remain free of injury    Over the shift, the patient did not make progress toward the following goals. Barriers to progression include aphasia which hinders her understanding. Recommendations to address these barriers include give very simple instructions and remind her frequently.

## 2025-08-09 NOTE — PROGRESS NOTES
"Speech-Language Pathology    SLP Adult Inpatient Speech-Language Cognition    Patient Name: Karey Oakley \"marilyn\"  MRN: [id number]  Today's Date: 8/9/2025   Time Calculation  Start Time: 0940  Stop Time: 1012  Time Calculation (min): 32 min       Current Problem:   1. Acute CVA (cerebrovascular accident) (Multi)  Transthoracic Echo Complete    Transthoracic Echo Complete      2. Acute kidney injury        3. Syncope and collapse  Transthoracic Echo Complete    Transthoracic Echo Complete        SLP Assessment:  Pt presents with a severe level of impairment with cognitive-linguistic functioning characterized by deficits with orientation, word finding, attention, and short term recall skills.     Skilled SLP services are warranted in order to facilitate pt safety, pt independence, and return to PLOF.    SLP TX Intervention Outcomes: Goals Established   Prognosis: good  Treatment Tolerance: Pt tolerated treatment well  Medical Staff Made Aware: Yes   Strengths: Cooperation, motivation  Barriers: Medical status, cognitive impairment      SLP Plan:   Inpatient/Swing Bed or Outpatient: Inpatient  Treatment/Interventions: Skilled ST services for speech and language   SLP Plan: Skilled SLP   SLP Frequency: 3x per week   Duration: 30 days   Discussed POC: Patient, Nursing   Discussed Risks/Benefits: Yes  Patient/Caregiver Agreeable: Yes    Goals:  Pt will demonstrate orientation to all concepts with use of visual aids and environmental cues with 90% accuracy given occ cues.   Pt will demonstrate responsive naming skills during formal tx activities with 90% accuracy given min cues.   Pt will demonstrate generative naming skills during formal tx activities with 80% accuracy given min cues.   Pt will demonstrate word finding in informal conversation with 80% accuracy given min cues.   Pt will demonstrate short term recall skills with use of compensatory strategies for recall with 80% accuracy given min cues.       Subjective "   Current Problem:  80 y.o. female presenting with Chief Complaint of  Altered Mental Status.  Patient presents to [facility] ED via EMS after she was found down on the floor at home by a family member.  Patient unable to produce any speech and she was found to have right-sided weakness.  Unknown last known well.  Patient arrived in c-collar.  Patient unable to follow commands.     General Visit Information:   Chart Reviewed: Yes  Patient Class: Inpatient  Living Environment: Home  Ordering Physician: Yolanda Gilliland  Reason for Referral: aphasia and dysarthria  Referred By: Yolanda Gilliland  Past Medical History Relevant to Rehab:   Medical History[1]   Prior Level of Function: WFL  [employer] Diet: Regular/Thin  Current Diet : NPO  Prior to Session Communication: Nursing    Objective   Pain:  Pain Assessment: 0-10  Pain Score: 0  Diagnosis: severe cognitive-linguistic impairment      Cognition:  Pt presents with a severe cognitive impairment characterized by deficits with short term recall, orientation, and attention skills.     Motor Speech Production:  Pt presented with motor speech production WFL, improved from prior reports upon admission. Pt demonstrated ability to complete oral [facility] exam with no deficits noted and no impairment in motor speech production observed across both formal and informal tasks.       Auditory Comprehension:   WFL. Pt demonstrated ability to follow commands, respond to yes/no questions, and respond to open ended questions.     Verbal:   Pt presents with a severe level of impairment with verbal expression skills characterized by deficits in responsive naming, generative naming, and word finding during both formal and informal tasks.    SLP Outcome Measures:   Pt administered MoCA with scores as follows:    Orientation- 1/6  Naming- 3/3  Attention- 1/4  Language- 1/3  Abstraction- 1/2  Delayed Recall- 1/5 with MIS= 10/15  Visuospatial/Executive- 0/5    Score: 8 out of 30  indicating a severe level of impairment with cognitive-linguistic skills.     Inpatient:  Education Documentation  Pt educated on results of evaluation and recommendations made.  Education Comments  Pt reported understanding and agreement.     Recommendations:   Skilled ST services for cognitive-linguistic reeducation in order to facilitate pt safety, pt independence, pt functional communication skills, and pt return to PLOF.     Consultations/Referrals/Coordination of Services: N/A           [1]   Past Medical History:  Diagnosis Date    Arthritis     Hypertension     Personal history of other diseases of the circulatory system     History of hypertension    Personal history of other endocrine, nutritional and metabolic disease     History of hypercholesterolemia    Personal history of other endocrine, nutritional and metabolic disease     History of diabetes mellitus

## 2025-08-09 NOTE — CONSULTS
"Inpatient consult to Pulmonology  Consult performed by: Scott Nation MD  Consult ordered by: Serjio Rangel MD  Reason for consult: Pneumonia of both upper lobes          Department of Medicine  Division of Pulmonary, Critical Care, and Sleep Medicine    Reason For Consult  I was askedto evaluate Ms. Karey Oakley \"marilyn\" for Pneumonia of both upper lobes .    History Of Present Illness  Karey Oakley \"marilyn\" is an 80 y.o. female never smoker with a history of DM,  hypertension, CKD 3, hyperlipidemia, HFpEF, chronic hyponatremia . Found on floor with aphasia by family admitted for work up and management pf stroke.. in EW CXR showed ild interstitial prominence which could be chronic or relate to component of developing interstitial edema/CHF. CT angio Head and neck: Multifocal ground-glass opacities both lungs. Findings could be related to edema or infection. Patient is afebrile with 96% SPO2 on RA          Past Medical History:  Medical History[1]    Surgical History:  Surgical History[2]    Social History:   Social History[3]    Family History:  Family History[4]        Vital Signs  Visit Vitals  /74 (BP Location: Left arm, Patient Position: Lying)   Pulse 54   Temp 36.2 °C (97.2 °F) (Temporal)   Resp 17   Ht 1.6 m (5' 2.99\")   Wt 86.8 kg (191 lb 5.8 oz)   SpO2 96%   BMI 33.91 kg/m²   OB Status Unknown   Smoking Status Never   BSA 1.96 m²        Physical Exam  General: No distress, awake and alert, verbalizes single words, follows commands  Chest: few basilar crackles  Lower Extremities: trace edema      Oxygen Therapy  SpO2: 96 %  Medical Gas Therapy: None (Room air)            Medications   Scheduled medications  Scheduled Medications[5]  Continuous medications  Continuous Medications[6]  PRN medications  PRN Medications[7]     Allergies  Jardiance [empagliflozin]      Lab Results     Lab Results   Component Value Date    WBC 8.9 08/09/2025    HGB 11.1 (L) 08/09/2025    HCT 33.2 (L) 08/09/2025 "    MCV 87 08/09/2025     08/09/2025      Lab Results   Component Value Date    GLUCOSE 103 (H) 08/09/2025    CALCIUM 8.9 08/09/2025     08/09/2025    K 3.4 (L) 08/09/2025    CO2 24 08/09/2025     08/09/2025    BUN 12 08/09/2025    CREATININE 0.94 08/09/2025      Lab Results   Component Value Date    ALT 6 (L) 08/09/2025    AST 12 08/09/2025    ALKPHOS 66 08/09/2025    BILITOT 0.4 08/09/2025       Latest Reference Range & Units 08/08/25 17:24   POCT pH, Venous 7.33 - 7.43 pH 7.41   POCT pCO2, Venous 41 - 51 mm Hg 43   POCT pO2, Venous 35 - 45 mm Hg 34 (L)       Chest Radiograph   No results found for this or any previous visit from the past 10 days.      XR chest 1 view 08/08/2025    Narrative  Interpreted By:  Jacqui Hedrick,  STUDY:  XR CHEST 1 VIEW;  8/8/2025 5:27 pm    INDICATION:  Signs/Symptoms:AMS.    COMPARISON:  Chest x-ray 01/31/2021    ACCESSION NUMBER(S):  [id number]    ORDERING CLINICIAN:  MARTY LEJEUNE    FINDINGS:  Multiple overlying leads are present.    CARDIOMEDIASTINAL SILHOUETTE:  Cardiac silhouette is enlarged. Mild interstitial prominence.    LUNGS:  No consolidation, pleural effusion or pneumothorax.    ABDOMEN:  No remarkable upper abdominal findings.    BONES:  Multilevel degenerative changes of the spine.    Impression  Cardiomegaly with mild interstitial prominence which could be chronic  or relate to component of developing interstitial edema/CHF.  Correlate clinically.    MACRO:  None    Signed by: Jacqui Hedrick 8/8/2025 5:54 PM  Dictation workstation:   [id number]  CT angio Head and neck   IMPRESSION:  1. Abrupt cut off distal M1 segment left middle cerebral artery,  presumed thrombosis, with attenuated arborization of the left middle  cerebral artery.  2. No flow-limiting stenosis or significant plaque irregularity in  the neck.  3. Multifocal ground-glass opacities both lungs. Findings could be  related to edema or infection.     Chest CT angiography 12/04 at  [facility]  No CT evidence of acute or chronic pulmonary embolism.   Main pulmonary artery normal in caliber.   Mosaic attenuation present bilaterally compared with prior exam.  This   can be seen with small airways disease or small vessel disease.   Mildly dilated left atrium     Assessment and Plan / Recommendations   Assessment/Plan   80 y.o. female never smoker with a history of DM,  hypertension, CKD 3, hyperlipidemia, HFpEF, chronic hyponatremia on Apixaban admitted with CVA. The radiologic findings and clinical exam are suggestive of mild chf. Has adequate saturation on RA 96%, normal WBC. Patient is currently NPO on IV fluids. Would stop fluids once possible and resume her diuretics. No evidence of chronic PE on CT angio and V/Q scan at [facility] in December 2024  Nothing to add from pulmonary. Will sign off    Scott Nation MD      Date: 08/09/25  Time: 8:22 AM              [1]   Past Medical History:  Diagnosis Date    Arthritis     Hypertension     Personal history of other diseases of the circulatory system     History of hypertension    Personal history of other endocrine, nutritional and metabolic disease     History of hypercholesterolemia    Personal history of other endocrine, nutritional and metabolic disease     History of diabetes mellitus   [2]   Past Surgical History:  Procedure Laterality Date    EYE SURGERY      HYSTERECTOMY      JOINT REPLACEMENT      TOTAL KNEE ARTHROPLASTY  09/25/2013    Total Knee Arthroplasty   [3]   Social History  Tobacco Use    Smoking status: Never    Smokeless tobacco: Never   Substance Use Topics    Alcohol use: Never    Drug use: Not Currently   [4]   Family History  Problem Relation Name Age of Onset    Stroke Mother     [5] aspirin, 300 mg, rectal, Nightly  cefTRIAXone, 1 g, intravenous, q24h  heparin (porcine), 5,000 Units, subcutaneous, q8h  pantoprazole, 40 mg, intravenous, Nightly  perflutren lipid microspheres, 0.5-10 mL of dilution, intravenous, Once in  imaging  perflutren protein A microsphere, 0.5 mL, intravenous, Once in imaging  sulfur hexafluoride microsphr, 2 mL, intravenous, Once in imaging  [6] D5 % and 0.9 % sodium chloride, 50 mL/hr, Last Rate: 50 mL/hr (08/09/25 0653)  [7] PRN medications: acetaminophen **OR** acetaminophen **OR** acetaminophen, albuterol, dextrose, dextrose, glucagon, glucagon, labetaloL, oxygen, polyethylene glycol

## 2025-08-09 NOTE — CONSULTS
"  Inpatient consult to Neurology  Consult performed by: Buster Laguna MD  Consult ordered by: Serjio Rangel MD        Vascular Neurology Consult Note    HPI  Karey Oakley \"marilyn\" is a 80 y.o. Right-handed female with PMH of HTN, HLD, HFpEF, depression/anxiety who presented to [facility] on 8/8/2025 after being found down, neurology consulted for stroke.    Hx from pt and chart review.  Pt was last seen normal by son at 7pm the day prior. Then the next day son went over to check on her as pt was not answering phone today, found her on the floor at 4pm. Pt states she was suddenly weak and fell, without LOC, and could not get back up. She came to ED for evaluation.    In the ED, pt noted to be nonverbal/mute, with RLE drift. NIHSS 10 for aphasia and drift in RLE. CTA showed LM1 cutoff. However MT deferred due to unclear LKW and pt's age/ functional status, delay in CTP processing.     Pt was loaded with aspirin / plavix, admitted for further evaluation. Since admission, pt's speech improved and pt is now able to speak in sentences, though still with significant word-finding difficulty.     She lives independently. There was concern for MCI in geriatric clinic.     Review of imaging, labs, echo and other data:   MRI: pending   CTA: LM1 cutoff, no significant carotid stenosis   CTP: Tmax >6s only 5cc, Tmax >4s 20cc   Echo: pending   LDL: 67  HbA1C: 5.4 on 3/3/2025, updated lab pending       Past Medical History  Medical History[1]  Surgical History  Surgical History[2]  Social History  Social History[3]  Allergies  Jardiance [empagliflozin]    Home Medications  Current Outpatient Medications   Medication Instructions    allopurinol (Zyloprim) 100 mg tablet oral    amLODIPine (NORVASC) 10 mg, Daily    APIXABAN ORAL oral    atorvastatin (LIPITOR) 40 mg, Daily RT    buPROPion SR (WELLBUTRIN SR) 150 mg, Daily RT    ciprofloxacin (Cipro) 250 mg tablet 1 tablet, Every 12 hours scheduled (0630,1830)    estrogens " "(conjugated) (PREMARIN) 1 g, vaginal, Daily RT    famotidine (Pepcid) 20 mg tablet 1 tablet, Daily (0630)    fenofibrate (Tricor) 54 mg tablet oral    furosemide (LASIX) 10 mg, oral    Jardiance 10 mg, Daily with breakfast    losartan (COZAAR) 100 mg, Daily RT    metFORMIN  mg 24 hr tablet oral    metoprolol tartrate (LOPRESSOR) 100 mg, 2 times daily    nitrofurantoin, macrocrystal-monohydrate, (Macrobid) 100 mg capsule oral    omeprazole (PRILOSEC) 20 mg, oral, Daily RT    sertraline (ZOLOFT) 150 mg, Daily RT    traMADol (Ultram) 50 mg tablet Take by mouth.          Objective   24h Vitals  Heart Rate:  [49-78]   Temp:  [36.2 °C (97.2 °F)-36.5 °C (97.7 °F)]   Resp:  [12-28]   BP: (113-138)/()   Height:  [160 cm (5' 2.99\")-160 cm (5' 3\")]   Weight:  [84.4 kg (186 lb 1.1 oz)-86.8 kg (191 lb 5.8 oz)]   SpO2:  [82 %-99 %]      Physical Exam  Neurological Exam  Physical Exam  MENTAL STATUS:  General appearance: in NAD  Language: comprehension is preserved. Able to speak fairly fluently in full sentences though with significant word-finding difficulty and with significant naming deficit.   Concentration: Intact  Fund of knowledge: Appropriate    CRANIAL NERVES:  - Fundoscopic exam: Deferred   - II/III: PERRL  - II:  Visual fields intact to confrontation bilaterally   - III, IV, VI: EOMI to pursuit without nystagmus  - V: V1-V3 sensation intact bilaterally  - VII: Face muscles symmetric with smile and eye closure  - VIII: Intact to finger rub  - IX, X: Palate elevated symmetrically bilaterally, no hoarseness  - XI: 5/5 strength on shoulder shrugging bilaterally  - XII: Tongue midline without atrophy or fasciculation    MOTOR: Tone and bulk normal in all extremities  STRENGTH: 5/5 strength tested proximally and distally in BUE and BLE. Fine finger movements intact.     REFLEXES: R L  Biceps   +1   +1  Triceps  +1   +1  Brachioradialis +1   +1  Patellar   +1   +1  Achilles   +1   +1  Plantar  Mute  Mute   No " clonus  COORDINATION: Intact on finger to nose bl, intact on heel to shin bl, FELICITA intact bl  SENSORY: Intact to light touch in BUE and BLE  GAIT: deferred       NIHSS:  Level of Consciousness: 0=alert      LOC questions: 0=answers both questions      LOC commands: 0=performs both  Best Gaze: 0=normal  Visual: 0=normal  Facial Palsy: 0=normal  Motor:      Motor Arm (Left): 0=normal      Motor Arm (Right): 0=normal      Motor Leg (Left): 0=left leg normal      Motor Leg (Right): 0=right leg normal  Limb Ataxia: 0=absent  Sensory: 0=normal  Best Language: 1=mild-mod aphasia (can communicate ideas)  Dysarthria: 1=mild to moderate dysarthria (can understand)  Extinction and Inattention: 0=no abnormality    Recent Labs  Results from last 72 hours   Lab Units 08/09/25  0626 08/08/25  1724   SODIUM mmol/L 138 137   POTASSIUM mmol/L 3.4* 4.0   BUN mg/dL 12 18   CREATININE mg/dL 0.94 1.34*   CALCIUM mg/dL 8.9 9.4   MAGNESIUM mg/dL  --  1.67      Results from last 72 hours   Lab Units 08/09/25  0626 08/08/25  1724   WBC AUTO x10*3/uL 8.9 8.8   HEMOGLOBIN g/dL 11.1* 12.3   HEMATOCRIT % 33.2* 37.3   PLATELETS AUTO x10*3/uL 333 403      Results from last 72 hours   Lab Units 08/08/25  1724   INR  1.1       Lab Results   Component Value Date    HGBA1C 5.4 03/03/2025    HGBA1C 5.4 06/14/2023    HGBA1C 5.3 02/13/2023        Imaging  MRI: No MRI brain results found for the past 12 months  CT Head: CT head wo IV contrast  Result Date: 8/8/2025  A hyperdense left MCA sign is suspected. Further evaluation with CT angiogram and/or dedicated brain MRI/MRA is recommended.   Chronic changes as noted above.   MACRO: Jacqui Hedrick discussed the significance and urgency of this critical finding by telephone with  MARTY LEJEUNE on 8/8/2025 at 5:51 pm. (**-RCF-**) Findings:  See findings.   Signed by: Jacqui Hedrick 8/8/2025 5:53 PM Dictation workstation:   [id number]    CT head wo IV contrast  Result Date: 6/18/2025  IMPRESSION: No acute  intracranial findings. : [facility]   Transcribe Date/Time: Jun 18 2025  8:38P Dictated by : MACARIO FERMIN MD This examination was interpreted and the report reviewed and electronically signed by: MACARIO FERMIN MD on Jun 18 2025  8:53PM  EST    CT head wo IV contrast  Result Date: 5/21/2025  IMPRESSION: No acute intracranial process. _ : [facility]   Transcribe Date/Time: May 21 2025  4:54P Dictated by : MINA VEGAS MD This examination was interpreted and the report reviewed and electronically signed by:   MINA VEGAS MD on May 21 2025  4:55PM  EST    CTA: CT brain attack perfusion w IV contrast  Result Date: 8/9/2025  1. Please note that although the initial perfusion images were obtained at 9 p.m. on 08/08/2025, post processing images were not supplied to PACS for report generation until 12:30 a.m. on 08/09/2025. 2. Rapid perfusion imaging demonstrates a geographic area of elevated T-max in the left middle cerebral artery territory suggestive of some hypoperfusion/penumbra, although there is no evidence of decreased blood flow/blood volume the meets criteria for core infarct.   MACRO: None   Signed by: Matthew Victoria 8/9/2025 12:44 AM Dictation workstation:   [id number]    CT angio head and neck w and wo IV contrast  Result Date: 8/8/2025  1. Abrupt cut off distal M1 segment left middle cerebral artery, presumed thrombosis, with attenuated arborization of the left middle cerebral artery. 2. No flow-limiting stenosis or significant plaque irregularity in the neck. 3. Multifocal ground-glass opacities both lungs. Findings could be related to edema or infection.   MACRO: Ritchie Dominguez discussed the significance and urgency of this critical finding by EPIC secure chat with  MARTY LEJEUNE on 8/8/2025 at 7:04 pm.  (**-RCF-**) Findings:  See findings.   Signed by: Ritchie Dominguez 8/8/2025 7:04 PM Dictation workstation:   [id number]    Echo: No echocardiogram results found for the past 12  "months             Assessment/Plan   Karey Oakley \"marilyn\" is a 80 y.o. Right-handed female with PMH of HTN, HLD, HFpEF, depression/anxiety who presented to [facility] on 8/8/2025 after being found down, found with LM1 cutoff, but with only low mismatch volume on CTP.     Pt's language is much improved, though still with significant word finding difficulty.       Stroke workup:   MRI: pending   CTA: LM1 cutoff, no significant carotid stenosis   CTP: Tmax >6s only 5cc, Tmax >4s 20cc   Echo: pending   LDL: 67  HbA1C: 5.4 on 3/3/2025, updated lab pending     Diagnoses:  LMCA stroke, etiology r/o cardioembolic     Recommendations:  Continue DAPT (pt passed swallow)   Stroke workup  MRI brain w/o contrast stroke protocol  TTE w/ bubble study pending read   A1c pending   Pt will need holter monitor on discharge   Continue home fenofibrate and home atorvastatin 40mg   sBP goal < 220  NPO until nurse bedside swallow assessment   Please utilize focused stroke order set     Vascular Risk Factor modification goals:  Blood pressure goals: avoid hypotension SBP <100 that could worsen cerebral perfusion, long term BP goal normotensive <130/80. Acute BP goals see above.   Lipid Goals: education on healthy diet and statin therapy to maintain or achieve goal LDL-cholesterol < 70mg.  Glucose Goals: early treatment of hyperglycemia to goal glucose 140-180 mg/dl with long-term goal A1c < 7%   Smoking Cessation and Education  Assessment for Rehabilitation needs including PT/OT and if indicated swallow evaluation and speech therapy   Patient and family education on signs and symptoms of stroke, calling 911, risk factors, and healthy strategies for stroke prevention.      I personally spent 80 minutes today, exclusive of procedures, providing care for this patient, including preparation, face to face time, documentation and other services such as review of medical records, diagnostic result, patient education, counseling, coordination " of care as specified in the encounter.        [1]   Past Medical History:  Diagnosis Date    Arthritis     Hypertension     Personal history of other diseases of the circulatory system     History of hypertension    Personal history of other endocrine, nutritional and metabolic disease     History of hypercholesterolemia    Personal history of other endocrine, nutritional and metabolic disease     History of diabetes mellitus   [2]   Past Surgical History:  Procedure Laterality Date    EYE SURGERY      HYSTERECTOMY      JOINT REPLACEMENT      TOTAL KNEE ARTHROPLASTY  09/25/2013    Total Knee Arthroplasty   [3]   Social History  Tobacco Use    Smoking status: Never    Smokeless tobacco: Never   Substance Use Topics    Alcohol use: Never    Drug use: Not Currently

## 2025-08-09 NOTE — PROGRESS NOTES
"Karey Oakley \"mick" is a 80 y.o. female on day 1 of admission presenting with Acute CVA (cerebrovascular accident) (Multi).      Subjective   Patient denied chest pain or shortness of breath.  She did have an episode provide 70s.  Patient  to talk partially ,she remains partially aphasic.       Objective     Last Recorded Vitals  /66 (BP Location: Left arm, Patient Position: Lying)   Pulse 78   Temp 36.5 °C (97.7 °F) (Temporal)   Resp 18   Wt 86.8 kg (191 lb 5.8 oz)   SpO2 96%   Intake/Output last 3 Shifts:    Intake/Output Summary (Last 24 hours) at 8/9/2025 1035  Last data filed at 8/9/2025 0653  Gross per 24 hour   Intake 1661.49 ml   Output --   Net 1661.49 ml       Admission Weight  Weight: 84.4 kg (186 lb 1.1 oz) (08/08/25 1717)    Daily Weight  08/09/25 : 86.8 kg (191 lb 5.8 oz)    Image Results  CT brain attack perfusion w IV contrast  Narrative: Interpreted By:  Matthew Victoria,   STUDY:  CT BRAIN ATTACK PERFUSION W IV CONTRAST;  8/8/2025 9:08 pm      INDICATION:  Signs/Symptoms:CVA.          COMPARISON:  CTA of the head and neck dated 08/08/2025.      ACCESSION NUMBER(S):  [id number]      ORDERING CLINICIAN:  MARTY LEJEUNE      TECHNIQUE:  CT perfusion imaging was performed after administration of 70 mL of  Omnipaque 350 intravenous contrast administration. The images were  processed on an independent workstation and multiple parametric maps  were generated and assessed.      FINDINGS:  Please note that although the initial perfusion images were obtained  at 9 p.m. on 08/08/2025, post processing images were not supplied to  PACS for report generation until 12:30 a.m. on 08/09/2025.      Rapid perfusion: Perfusion imaging demonstrate geographic area of  elevated T-max involving left middle cerebral artery territory  without evidence of significantly diminished blood flow or blood  volume.      The volume of parenchyma with diminished flow less than 30% and  increased time to peak " greater than 6 seconds is approximately 0 mL.  The volume of parenchyma with elevated time to peak greater than 6  seconds is 5 mL.      Mismatch ratio of Infinite.  Mismatch volume of 5 ml.      Impression: 1. Please note that although the initial perfusion images were  obtained at 9 p.m. on 08/08/2025, post processing images were not  supplied to PACS for report generation until 12:30 a.m. on 08/09/2025.  2. Rapid perfusion imaging demonstrates a geographic area of elevated  T-max in the left middle cerebral artery territory suggestive of some  hypoperfusion/penumbra, although there is no evidence of decreased  blood flow/blood volume the meets criteria for core infarct.      MACRO:  None      Signed by: Matthew Victoria 8/9/2025 12:44 AM  Dictation workstation:   [id number]      Physical Exam    General: cooperating during physical exam.  HEENT: Pupils are equal and reactive to light and commendation , oral mucosa moist, no JVD .  Cardiovascular: Normal sinus rhythm, no MRG.  Lungs: Clear to auscultation bilaterally, no wheezing, no crackles, no dullness to percussion.  Abdomen: No hepatosplenomegaly appreciated, soft , not tender, positive bowel sounds, positive bowel movement.  Neuro: Alert and oriented x2, strength in upper and lower extremities , sensation intact.  Musculoskeletal: No swelling in lower extremities, no limitation in range of motion.  Vascular: Pulses are intact in upper and lower extremities  Skin: No petechiae, ecchymosis or other stigmata for dermatology disease.     Assessment and plan    Acute CVA  CT brain/CTA done in ER  CTA revealed abrupt cut of distal emboli segment of left MCA.  Patient was not  candidate for TNK, thrombectomy.  MRI brain today  2D echo  Waiting for neurology to see her today  Continue with aspirin and Plavix for 90 days continue with aspirin indefinitely.  PT OT to see her  Fall precaution  Speech therapy    Urinary tract infection  Patient is on  ceftriaxone.    Probable pneumonia.  Evaluated by pulmonary on case.  Patient is on antibiotics    Chronic pulmonary embolism.  Patient has been on Eliquis.  Patient has been evaluated at the [facility] system.  Going over her  records patient was not discharged on anticoagulation from [facility].  Patient stated she does not take blood thinner.    HFpEF  Clinically stable    Hypertension  Fairly controlled    Diabetes mellitus type 2  GERD  Clinically stable    PT OT to see her  Speech therapy to see her.  Waiting for neurology to evaluate her.  MRI brain, 2D echo  Time spent with the patient 51 minutes      Yolanda Gilliland MD

## 2025-08-10 LAB
ANION GAP SERPL CALCULATED.3IONS-SCNC: 11 MMOL/L (ref 10–20)
BACTERIA UR CULT: NORMAL
BUN SERPL-MCNC: 10 MG/DL (ref 6–23)
CALCIUM SERPL-MCNC: 9.2 MG/DL (ref 8.6–10.3)
CHLORIDE SERPL-SCNC: 104 MMOL/L (ref 98–107)
CO2 SERPL-SCNC: 26 MMOL/L (ref 21–32)
CREAT SERPL-MCNC: 0.72 MG/DL (ref 0.5–1.05)
EGFRCR SERPLBLD CKD-EPI 2021: 85 ML/MIN/1.73M*2
ERYTHROCYTE [DISTWIDTH] IN BLOOD BY AUTOMATED COUNT: 14.3 % (ref 11.5–14.5)
GLUCOSE BLD MANUAL STRIP-MCNC: 102 MG/DL (ref 74–99)
GLUCOSE BLD MANUAL STRIP-MCNC: 106 MG/DL (ref 74–99)
GLUCOSE SERPL-MCNC: 112 MG/DL (ref 74–99)
HCT VFR BLD AUTO: 35.1 % (ref 36–46)
HGB BLD-MCNC: 11.7 G/DL (ref 12–16)
MCH RBC QN AUTO: 28.8 PG (ref 26–34)
MCHC RBC AUTO-ENTMCNC: 33.3 G/DL (ref 32–36)
MCV RBC AUTO: 87 FL (ref 80–100)
NRBC BLD-RTO: 0 /100 WBCS (ref 0–0)
PLATELET # BLD AUTO: 343 X10*3/UL (ref 150–450)
POTASSIUM SERPL-SCNC: 3.7 MMOL/L (ref 3.5–5.3)
PROCALCITONIN SERPL-MCNC: 0.04 NG/ML
RBC # BLD AUTO: 4.06 X10*6/UL (ref 4–5.2)
SODIUM SERPL-SCNC: 137 MMOL/L (ref 136–145)
WBC # BLD AUTO: 9.5 X10*3/UL (ref 4.4–11.3)

## 2025-08-10 PROCEDURE — 97116 GAIT TRAINING THERAPY: CPT | Mod: GP,CQ

## 2025-08-10 PROCEDURE — 36415 COLL VENOUS BLD VENIPUNCTURE: CPT | Performed by: INTERNAL MEDICINE

## 2025-08-10 PROCEDURE — 80048 BASIC METABOLIC PNL TOTAL CA: CPT | Performed by: INTERNAL MEDICINE

## 2025-08-10 PROCEDURE — 82947 ASSAY GLUCOSE BLOOD QUANT: CPT

## 2025-08-10 PROCEDURE — 85027 COMPLETE CBC AUTOMATED: CPT | Performed by: INTERNAL MEDICINE

## 2025-08-10 PROCEDURE — 99239 HOSP IP/OBS DSCHRG MGMT >30: CPT | Performed by: INTERNAL MEDICINE

## 2025-08-10 PROCEDURE — 2500000004 HC RX 250 GENERAL PHARMACY W/ HCPCS (ALT 636 FOR OP/ED): Performed by: INTERNAL MEDICINE

## 2025-08-10 PROCEDURE — 2060000001 HC INTERMEDIATE ICU ROOM DAILY

## 2025-08-10 PROCEDURE — 99232 SBSQ HOSP IP/OBS MODERATE 35: CPT | Performed by: STUDENT IN AN ORGANIZED HEALTH CARE EDUCATION/TRAINING PROGRAM

## 2025-08-10 PROCEDURE — 2500000001 HC RX 250 WO HCPCS SELF ADMINISTERED DRUGS (ALT 637 FOR MEDICARE OP): Performed by: STUDENT IN AN ORGANIZED HEALTH CARE EDUCATION/TRAINING PROGRAM

## 2025-08-10 PROCEDURE — 97530 THERAPEUTIC ACTIVITIES: CPT | Mod: GP,CQ

## 2025-08-10 PROCEDURE — 2500000001 HC RX 250 WO HCPCS SELF ADMINISTERED DRUGS (ALT 637 FOR MEDICARE OP): Performed by: INTERNAL MEDICINE

## 2025-08-10 PROCEDURE — 97110 THERAPEUTIC EXERCISES: CPT | Mod: GP,CQ

## 2025-08-10 RX ORDER — ACETAMINOPHEN 325 MG/1
650 TABLET ORAL EVERY 4 HOURS PRN
Start: 2025-08-10 | End: 2025-08-15

## 2025-08-10 RX ORDER — FUROSEMIDE 20 MG/1
10 TABLET ORAL 3 TIMES WEEKLY
Start: 2025-08-11 | End: 2025-09-10

## 2025-08-10 RX ORDER — ASPIRIN 81 MG/1
81 TABLET ORAL DAILY
Start: 2025-08-11 | End: 2025-09-10

## 2025-08-10 RX ORDER — POLYETHYLENE GLYCOL 3350 17 G/17G
17 POWDER, FOR SOLUTION ORAL DAILY PRN
Start: 2025-08-10 | End: 2025-09-09

## 2025-08-10 RX ORDER — CLOPIDOGREL BISULFATE 75 MG/1
75 TABLET ORAL DAILY
Start: 2025-08-11 | End: 2025-09-10

## 2025-08-10 RX ORDER — ATORVASTATIN CALCIUM 40 MG/1
40 TABLET, FILM COATED ORAL NIGHTLY
Status: DISPENSED | OUTPATIENT
Start: 2025-08-10

## 2025-08-10 RX ADMIN — HEPARIN SODIUM 5000 UNITS: 5000 INJECTION INTRAVENOUS; SUBCUTANEOUS at 23:09

## 2025-08-10 RX ADMIN — HEPARIN SODIUM 5000 UNITS: 5000 INJECTION INTRAVENOUS; SUBCUTANEOUS at 06:02

## 2025-08-10 RX ADMIN — ATORVASTATIN CALCIUM 40 MG: 40 TABLET, FILM COATED ORAL at 20:22

## 2025-08-10 RX ADMIN — HEPARIN SODIUM 5000 UNITS: 5000 INJECTION INTRAVENOUS; SUBCUTANEOUS at 14:50

## 2025-08-10 RX ADMIN — PANTOPRAZOLE SODIUM 40 MG: 40 INJECTION, POWDER, FOR SOLUTION INTRAVENOUS at 20:22

## 2025-08-10 RX ADMIN — CEFTRIAXONE 1 G: 1 INJECTION, SOLUTION INTRAVENOUS at 23:09

## 2025-08-10 RX ADMIN — ASPIRIN 81 MG: 81 TABLET, DELAYED RELEASE ORAL at 09:20

## 2025-08-10 RX ADMIN — CLOPIDOGREL 75 MG: 75 TABLET ORAL at 09:20

## 2025-08-10 SDOH — ECONOMIC STABILITY: FOOD INSECURITY: HOW HARD IS IT FOR YOU TO PAY FOR THE VERY BASICS LIKE FOOD, HOUSING, MEDICAL CARE, AND HEATING?: NOT HARD AT ALL

## 2025-08-10 SDOH — HEALTH STABILITY: PHYSICAL HEALTH: ON AVERAGE, HOW MANY DAYS PER WEEK DO YOU ENGAGE IN MODERATE TO STRENUOUS EXERCISE (LIKE A BRISK WALK)?: 0 DAYS

## 2025-08-10 SDOH — ECONOMIC STABILITY: FOOD INSECURITY: WITHIN THE PAST 12 MONTHS, THE FOOD YOU BOUGHT JUST DIDN'T LAST AND YOU DIDN'T HAVE MONEY TO GET MORE.: NEVER TRUE

## 2025-08-10 SDOH — ECONOMIC STABILITY: HOUSING INSECURITY: AT ANY TIME IN THE PAST 12 MONTHS, WERE YOU HOMELESS OR LIVING IN A SHELTER (INCLUDING NOW)?: NO

## 2025-08-10 SDOH — HEALTH STABILITY: MENTAL HEALTH
DO YOU FEEL STRESS - TENSE, RESTLESS, NERVOUS, OR ANXIOUS, OR UNABLE TO SLEEP AT NIGHT BECAUSE YOUR MIND IS TROUBLED ALL THE TIME - THESE DAYS?: NOT AT ALL

## 2025-08-10 SDOH — ECONOMIC STABILITY: FOOD INSECURITY: WITHIN THE PAST 12 MONTHS, YOU WORRIED THAT YOUR FOOD WOULD RUN OUT BEFORE YOU GOT THE MONEY TO BUY MORE.: NEVER TRUE

## 2025-08-10 SDOH — ECONOMIC STABILITY: HOUSING INSECURITY: IN THE LAST 12 MONTHS, WAS THERE A TIME WHEN YOU WERE NOT ABLE TO PAY THE MORTGAGE OR RENT ON TIME?: NO

## 2025-08-10 SDOH — SOCIAL STABILITY: SOCIAL INSECURITY: WITHIN THE LAST YEAR, HAVE YOU BEEN AFRAID OF YOUR PARTNER OR EX-PARTNER?: NO

## 2025-08-10 SDOH — SOCIAL STABILITY: SOCIAL INSECURITY: WITHIN THE LAST YEAR, HAVE YOU BEEN HUMILIATED OR EMOTIONALLY ABUSED IN OTHER WAYS BY YOUR PARTNER OR EX-PARTNER?: NO

## 2025-08-10 SDOH — ECONOMIC STABILITY: INCOME INSECURITY: IN THE PAST 12 MONTHS HAS THE ELECTRIC, GAS, OIL, OR WATER COMPANY THREATENED TO SHUT OFF SERVICES IN YOUR HOME?: NO

## 2025-08-10 SDOH — HEALTH STABILITY: MENTAL HEALTH: HOW OFTEN DO YOU HAVE SIX OR MORE DRINKS ON ONE OCCASION?: NEVER

## 2025-08-10 SDOH — HEALTH STABILITY: MENTAL HEALTH: HOW MANY DRINKS CONTAINING ALCOHOL DO YOU HAVE ON A TYPICAL DAY WHEN YOU ARE DRINKING?: PATIENT DOES NOT DRINK

## 2025-08-10 SDOH — HEALTH STABILITY: MENTAL HEALTH: HOW OFTEN DO YOU HAVE A DRINK CONTAINING ALCOHOL?: NEVER

## 2025-08-10 SDOH — ECONOMIC STABILITY: TRANSPORTATION INSECURITY: IN THE PAST 12 MONTHS, HAS LACK OF TRANSPORTATION KEPT YOU FROM MEDICAL APPOINTMENTS OR FROM GETTING MEDICATIONS?: NO

## 2025-08-10 SDOH — ECONOMIC STABILITY: HOUSING INSECURITY: IN THE PAST 12 MONTHS, HOW MANY TIMES HAVE YOU MOVED WHERE YOU WERE LIVING?: 0

## 2025-08-10 SDOH — HEALTH STABILITY: PHYSICAL HEALTH: ON AVERAGE, HOW MANY MINUTES DO YOU ENGAGE IN EXERCISE AT THIS LEVEL?: 0 MIN

## 2025-08-10 SDOH — SOCIAL STABILITY: SOCIAL INSECURITY: ARE YOU MARRIED, WIDOWED, DIVORCED, SEPARATED, NEVER MARRIED, OR LIVING WITH A PARTNER?: WIDOWED

## 2025-08-10 SDOH — HEALTH STABILITY: PHYSICAL HEALTH
HOW OFTEN DO YOU NEED TO HAVE SOMEONE HELP YOU WHEN YOU READ INSTRUCTIONS, PAMPHLETS, OR OTHER WRITTEN MATERIAL FROM YOUR DOCTOR OR PHARMACY?: RARELY

## 2025-08-10 SDOH — SOCIAL STABILITY: SOCIAL NETWORK: HOW OFTEN DO YOU ATTEND CHURCH OR RELIGIOUS SERVICES?: NEVER

## 2025-08-10 SDOH — SOCIAL STABILITY: SOCIAL NETWORK: HOW OFTEN DO YOU GET TOGETHER WITH FRIENDS OR RELATIVES?: ONCE A WEEK

## 2025-08-10 SDOH — SOCIAL STABILITY: SOCIAL NETWORK: HOW OFTEN DO YOU ATTEND MEETINGS OF THE CLUBS OR ORGANIZATIONS YOU BELONG TO?: NEVER

## 2025-08-10 ASSESSMENT — ACTIVITIES OF DAILY LIVING (ADL): LACK_OF_TRANSPORTATION: NO

## 2025-08-10 ASSESSMENT — COGNITIVE AND FUNCTIONAL STATUS - GENERAL
MOBILITY SCORE: 16
CLIMB 3 TO 5 STEPS WITH RAILING: A LOT
MOVING TO AND FROM BED TO CHAIR: A LOT
DAILY ACTIVITIY SCORE: 13
EATING MEALS: A LITTLE
MOVING TO AND FROM BED TO CHAIR: A LITTLE
CLIMB 3 TO 5 STEPS WITH RAILING: TOTAL
WALKING IN HOSPITAL ROOM: A LOT
HELP NEEDED FOR BATHING: A LOT
MOBILITY SCORE: 14
TURNING FROM BACK TO SIDE WHILE IN FLAT BAD: A LITTLE
DRESSING REGULAR LOWER BODY CLOTHING: A LOT
TURNING FROM BACK TO SIDE WHILE IN FLAT BAD: A LITTLE
MOVING FROM LYING ON BACK TO SITTING ON SIDE OF FLAT BED WITH BEDRAILS: A LITTLE
STANDING UP FROM CHAIR USING ARMS: A LITTLE
MOVING FROM LYING ON BACK TO SITTING ON SIDE OF FLAT BED WITH BEDRAILS: A LITTLE
WALKING IN HOSPITAL ROOM: A LOT
TOILETING: A LOT
STANDING UP FROM CHAIR USING ARMS: A LITTLE
PERSONAL GROOMING: A LOT
DRESSING REGULAR UPPER BODY CLOTHING: A LOT

## 2025-08-10 ASSESSMENT — PAIN - FUNCTIONAL ASSESSMENT
PAIN_FUNCTIONAL_ASSESSMENT: 0-10

## 2025-08-10 ASSESSMENT — LIFESTYLE VARIABLES
AUDIT-C TOTAL SCORE: 0
SKIP TO QUESTIONS 9-10: 1

## 2025-08-10 ASSESSMENT — PAIN SCALES - GENERAL
PAINLEVEL_OUTOF10: 0 - NO PAIN

## 2025-08-10 NOTE — PROGRESS NOTES
08/10/25 1104   [facility] Disability Status   Are you deaf or do you have serious difficulty hearing? N   Are you blind or do you have serious difficulty seeing, even when wearing glasses? N   Because of a physical, mental, or emotional condition, do you have serious difficulty concentrating, remembering, or making decisions? (5 years old or older) N   Do you have serious difficulty walking or climbing stairs? N   Do you have serious difficulty dressing or bathing? N   Because of a physical, mental, or emotional condition, do you have serious difficulty doing errands alone such as visiting the doctor? N  (does not drive)

## 2025-08-10 NOTE — PROGRESS NOTES
"Karey Oakley \"marilyn\" is a 80 y.o. female on day 2 of admission presenting with Acute CVA (cerebrovascular accident) (Multi).      Subjective   Patient denied headache or blurry vision.  She talk with full sentences but remained partially aphasic and have difficulty finding words         Objective     Last Recorded Vitals  /51 (BP Location: Left arm, Patient Position: Lying)   Pulse 64   Temp 36.1 °C (97 °F) (Temporal)   Resp 17   Wt 86.6 kg (190 lb 14.7 oz)   SpO2 98%   Intake/Output last 3 Shifts:    Intake/Output Summary (Last 24 hours) at 8/10/2025 0825  Last data filed at 8/10/2025 0033  Gross per 24 hour   Intake 658.33 ml   Output 2400 ml   Net -1741.67 ml       Admission Weight  Weight: 84.4 kg (186 lb 1.1 oz) (08/08/25 1717)    Daily Weight  08/10/25 : 86.6 kg (190 lb 14.7 oz)    Image Results  MR brain wo IV contrast  Interpreted By:  Marisa Cortés,   STUDY:  MR BRAIN WO IV CONTRAST      INDICATION:  Signs/Symptoms:CVA      COMPARISON:  CT head 08/08/2025.      ACCESSION NUMBER(S):  [id number]      ORDERING CLINICIAN:  WALE VALDIVIA      TECHNIQUE:  Multi-planar multi-sequential MR imaging of the brain was performed  without intravenous contrast.      FINDINGS:  VENTRICLES and EXTRA-AXIAL SPACES: Proportional enlargement of the  ventricles and cortical sulci compatible with volume loss.No abnormal  extra-axial fluid collection. Basal cisterns are patent. Pituitary  gland and sella are unremarkable.      BRAIN PARENCHYMA: Small focus of mild restricted diffusion in the  left posterior basal ganglia likely reflecting subacute infarct.  Extensive periventricular/subcortical white matter areas of T2/FLAIR  hyperintensity are nonspecific but may be related to small vessel  ischemic disease.No acute hemorrhage. No midline shift.      PARANASAL SINUSES/MASTOIDS: Visualized paranasal sinuses are  clear.The middle ears and mastoid air cells are clear.      OSSEOUS STRUCTURES: No suspicious osseous " lesions.      OTHER: None.      IMPRESSION  Small subacute infarct in the left posterior basal ganglia. No  associated hemorrhage.      I personally reviewed the images/study and I agree with the findings  as stated.      MACRO:  None.      Signed by: Marisa Cortés 8/9/2025 5:27 PM  Dictation workstation:   [id number]  Transthoracic Echo Complete             [place] [address]             Phone [phone]    TRANSTHORACIC ECHOCARDIOGRAM REPORT    Patient Name:       SAMIR CONDON     Reading Physician:    [id number] Leigh Ann Jackson MD  Study Date:         8/9/2025             Ordering Provider:    [id number] WALE VALDIVIA  MRN/PID:            [id number]             Fellow:  Accession#:         [id number]         Nurse:  Date of Birth/Age:  1945 / 80 years Sonographer:          Olga PINA  Gender Assigned at  F                    Additional Staff:  Birth:  Height:             160.00 cm            Admit Date:           8/8/2025  Weight:             86.64 kg             Admission Status:     Inpatient -                                                                 Routine  BSA / BMI:          1.90 m2 / 33.84      Department Location:  [facility] Step                      kg/m2                                      Down  Blood Pressure: 130 /74 mmHg    Study Type:    TRANSTHORACIC ECHO (TTE) COMPLETE  Diagnosis/ICD: Cerebral Infarction, unspecified-I63.9; Syncope and collapse-R55  Indication:    Stroke  CPT Codes:     Echo Complete w Full Doppler-15944    Patient History:  Pertinent History: HFpEF CHF HTN PAF Acute CVA PE DMII CKD.    Study Detail: The following Echo studies were performed: 2D, M-Mode, Doppler,                color flow, Strain and 3D. Agitated saline used as a  contrast                agent for intraseptal flow evaluation.       PHYSICIAN INTERPRETATION:  Left Ventricle: The left ventricular systolic function is normal with a visually estimated ejection fraction of 55-60%. There are no regional wall motion abnormalities. The left ventricular cavity size is normal. There is mild increased septal and normal posterior left ventricular wall thickness. Left Ventricular Global Longitudinal Strain - -15.1 %. Spectral Doppler shows a normal pattern of left ventricular diastolic filling.  Left Atrium: The left atrial size is mildly dilated. A bubble study using agitated saline was performed. Bubble study is negative.  Right Ventricle: The right ventricle is normal in size. There is normal right ventricular global systolic function.  Right Atrium: The right atrial size is normal.  Aortic Valve: The aortic valve is trileaflet. The aortic valve area by VTI is 2.05 cmÂ² with a peak velocity of 2.26 m/s. The peak and mean gradients are 13 mmHg and 10 mmHg, respectively, with a dimensionless index of 0.62. There is trace aortic valve regurgitation.  Mitral Valve: The mitral valve is normal in structure. There is mild mitral valve regurgitation. The E Vmax is 0.86 m/s.  Tricuspid Valve: The tricuspid valve is structurally normal. There is mild tricuspid regurgitation. The Doppler estimated right ventricular systolic pressure (RVSP) is slightly elevated at 34 mmHg.  Pulmonic Valve: The pulmonic valve is structurally normal. There is physiologic pulmonic valve regurgitation.  Pericardium: Trivial pericardial effusion.  Aorta: The aortic root is normal.  Systemic Veins: The inferior vena cava appears normal in size, with IVC inspiratory collapse greater than 50%.       CONCLUSIONS:   1. The left ventricular systolic function is normal with a visually estimated ejection fraction of 55-60%.   2. There is normal right ventricular global systolic function.   3. Mild mitral valve regurgitation.    4. Mild tricuspid regurgitation is visualized.   5. The Doppler estimated RVSP is slightly elevated at 34 mmHg.    QUANTITATIVE DATA SUMMARY:     2D MEASUREMENTS:             Normal Ranges:  Ao Root s:       3.15 cm  IVSd:            1.11 cm     (0.6-1.1cm)  LVPWd:           0.68 cm     (0.6-1.1cm)  LVIDd:           4.17 cm     (3.9-5.9cm)  LVIDs:           2.95 cm  LV Mass Index:   61.6 g/m2  LVEDV Index:     57.76 ml/m2  LV % FS          29.2 %       LEFT ATRIUM:                  Normal Ranges:  LA Vol A4C:        75.5 ml    (22+/-6mL/m2)  LA Vol A2C:        79.6 ml  LA Vol BP:         81.0 ml  LA Vol Index A4C:  39.8ml/m2  LA Vol Index A2C:  42.0 ml/m2  LA Vol Index BP:   42.7 ml/m2  LA Area A4C:       21.9 cm2  LA Area A2C:       23.5 cm2  LA Major Axis A4C: 5.4 cm  LA Major Axis A2C: 5.9 cm  LA Volume Index:   42.9 ml/m2  LA Vol A4C:        71.6 ml  LA Vol A2C:        77.0 ml  LA Vol Index BSA:  39.2 ml/m2       RIGHT ATRIUM:                Normal Ranges:  RA Vol A4C:        14.6 ml   (8.3-19.5ml)  RA Vol Index A4C:  7.7 ml/m2  RA Area A4C:       8.3 cm2  RA Major Axis A4C: 4.0 cm       M-MODE MEASUREMENTS:         Normal Ranges:  LAs:                 4.96 cm (2.7-4.0cm)       AORTA MEASUREMENTS:         Normal Ranges:  Ao Sinus, d:        3.10 cm (2.1-3.5cm)  Ao STJ, d:          3.00 cm (1.7-3.4cm)  Asc Ao, d:          3.30 cm (2.1-3.4cm)       LV SYSTOLIC FUNCTION:                                          Normal Ranges:  EF-A4C View:                      65 %  (>=55%)  EF-A2C View:                      52 %  EF-Biplane:                       55 %  EF-Visual:                        58 %  EF-3DQ:                           42 %  EF-Auto:                          50 %  LV EF Reported:                   58 %  Global Longitudinal Strain (GLS): -15 %       LV DIASTOLIC FUNCTION:             Normal Ranges:  MV Peak E:             0.86 m/s    (0.7-1.2 m/s)  MV Peak A:             0.83 m/s    (0.42-0.7 m/s)  E/A Ratio:              1.04        (1.0-2.2)  MV e'                  0.070 m/s   (>8.0)  MV lateral e'          0.08 m/s  MV medial e'           0.06 m/s  MV A Dur:              156.04 msec  E/e' Ratio:            12.28       (<8.0)  PulmV Sys Harvinder:         53.45 cm/s  PulmV Escobar Harvinder:        38.59 cm/s  PulmV S/D Harvinder:         1.39  PulmV A Revs Harvinder:      23.17 cm/s       MITRAL VALVE:          Normal Ranges:  MV DT:        268 msec (150-240msec)       AORTIC VALVE:                      Normal Ranges:  AoV Vmax:                2.26 m/s  (<=1.7m/s)  AoV Peak P.5 mmHg (<20mmHg)  AoV Mean PG:             10.2 mmHg (1.7-11.5mmHg)  LVOT Max Harvinder:            1.28 m/s  (<=1.1m/s)  AoV VTI:                 51.25 cm  (18-25cm)  LVOT VTI:                31.93 cm  LVOT Diameter:           2.05 cm   (1.8-2.4cm)  AoV Area, VTI:           2.05 cm2  (2.5-5.5cm2)  AoV Area,Vmax:           1.85 cm2  (2.5-4.5cm2)  AoV Dimensionless Index: 0.62       RIGHT VENTRICLE:  RV Basal 2.80 cm  RV Mid   3.20 cm  RV Major 5.6 cm       TRICUSPID VALVE/RVSP:          Normal Ranges:  Peak TR Velocity:     2.79 m/s  Est. RA Pressure:     3 mmHg  RV Syst Pressure:     34 mmHg  (< 30mmHg)  IVC Diam:             1.56 cm       PULMONIC VALVE:          Normal Ranges:  PV Accel Time:  78 msec  (>120ms)  PV Max Harvinder:     0.9 m/s  (0.6-0.9m/s)  PV Max PG:      3.4 mmHg       PULMONARY VEINS:  PulmV A Revs Harvinder: 23.17 cm/s  PulmV Escobar Harvinder:   38.59 cm/s  PulmV S/D Harvinder:    1.39  PulmV Sys Harvinder:    53.45 cm/s       AORTA:  Asc Ao Diam 3.30 cm       [id number] Leigh Ann Jackson MD  Electronically signed on 2025 at 4:00:40 PM       ** Final **  CT brain attack perfusion w IV contrast  Narrative: Interpreted By:  Matthew Victoria,   STUDY:  CT BRAIN ATTACK PERFUSION W IV CONTRAST;  2025 9:08 pm      INDICATION:  Signs/Symptoms:CVA.          COMPARISON:  CTA of the head and neck dated 2025.      ACCESSION NUMBER(S):  [id number]      ORDERING  CLINICIAN:  MARTY LEJEUNE      TECHNIQUE:  CT perfusion imaging was performed after administration of 70 mL of  Omnipaque 350 intravenous contrast administration. The images were  processed on an independent workstation and multiple parametric maps  were generated and assessed.      FINDINGS:  Please note that although the initial perfusion images were obtained  at 9 p.m. on 08/08/2025, post processing images were not supplied to  PACS for report generation until 12:30 a.m. on 08/09/2025.      Rapid perfusion: Perfusion imaging demonstrate geographic area of  elevated T-max involving left middle cerebral artery territory  without evidence of significantly diminished blood flow or blood  volume.      The volume of parenchyma with diminished flow less than 30% and  increased time to peak greater than 6 seconds is approximately 0 mL.  The volume of parenchyma with elevated time to peak greater than 6  seconds is 5 mL.      Mismatch ratio of Infinite.  Mismatch volume of 5 ml.      Impression: 1. Please note that although the initial perfusion images were  obtained at 9 p.m. on 08/08/2025, post processing images were not  supplied to PACS for report generation until 12:30 a.m. on 08/09/2025.  2. Rapid perfusion imaging demonstrates a geographic area of elevated  T-max in the left middle cerebral artery territory suggestive of some  hypoperfusion/penumbra, although there is no evidence of decreased  blood flow/blood volume the meets criteria for core infarct.      MACRO:  None      Signed by: Matthew Victoria 8/9/2025 12:44 AM  Dictation workstation:   [id number]      Physical Exam    General: cooperating during physical exam.  HEENT: Pupils are equal and reactive to light and commendation , oral mucosa moist, no JVD .  Cardiovascular: Normal sinus rhythm, no MRG.  Lungs: Clear to auscultation bilaterally, no wheezing, no crackles, no dullness to percussion.  Abdomen: No hepatosplenomegaly appreciated, soft , not  tender, positive bowel sounds, positive bowel movement.  Neuro: Alert and oriented x2, strength in upper and lower extremities , sensation intact.  Musculoskeletal: No swelling in lower extremities, no limitation in range of motion.  Vascular: Pulses are intact in upper and lower extremities  Skin: No petechiae, ecchymosis or other stigmata for dermatology disease.     Assessment and plan    Acute CVA  Aphasia  CT brain/CTA done in ER  CTA revealed abrupt cut of distal emboli segment of left MCA.  Patient was not  candidate for TNK, thrombectomy.  CT angio neck and head revealed breath cutoff of MI segment of left MCA  MRI brain today revealed left posterior basal ganglia small subacute infarct  Neurology on the case  Continue with aspirin and Plavix for 90 days continue with aspirin indefinitely.  PT /OT  evaluated her high intensity level of care  Fall precaution  Speech therapy    Urinary tract infection  Patient is on ceftriaxone.    Probable pneumonia.  Evaluated by pulmonary on case.  Patient is on antibiotics    Chronic pulmonary embolism.  Patient has been on Eliquis.  Patient has been evaluated at the [facility].  Going over her  records patient was not discharged on anticoagulation from [facility].  Patient denied to take blood thinner    HFpEF  Clinically stable    Hypertension  Fairly controlled    Diabetes mellitus type 2  GERD  Clinically stable    Diabetes mellitus type 2.  Hemoglobin A1c 5.5    Speech therapy to see her.  Continue with dual antiplatelet therapy.    Plan to discharge to SNF in a.m.  Plan to discharge with Holter monitor    Yolanda Gilliland MD

## 2025-08-10 NOTE — PROGRESS NOTES
08/10/25 1102   Discharge Planning   Living Arrangements Alone   Support Systems Children;Family members   Assistance Needed SNF vs C   Type of Residence Private residence   Number of Stairs to Enter Residence 3   Number of Stairs Within Residence 1  (Flight to basement)   Do you have animals or pets at home? No   Who is requesting discharge planning? Provider   Home or Post Acute Services Post acute facilities (Rehab/SNF/etc)   Type of Post Acute Facility Services Skilled nursing   Expected Discharge Disposition SNF   Does the patient need discharge transport arranged? Yes   [facility] coordination needed? Yes   Has discharge transport been arranged? No   Patient Choice   Provider Choice list and CMS website (https://medicare.gov/care-compare#search) for post-acute Quality and Resource Measure Data were provided and reviewed with: Patient   Patient / Family choosing to utilize agency / facility established prior to hospitalization No

## 2025-08-10 NOTE — CARE PLAN
The patient's goals for the shift include Unable    The clinical goals for the shift include free from injury      Problem: General Stroke  Goal: Establish a mutual long term goal with patient by discharge  Outcome: Progressing  Goal: Demonstrate improvement in neurological exam throughout the shift  Outcome: Progressing  Goal: Maintain BP within ordered limits throughout shift  Outcome: Progressing  Goal: Participate in treatment (ie., meds, therapy) throughout shift  Outcome: Progressing  Goal: No symptoms of aspiration throughout shift  Outcome: Progressing  Goal: No symptoms of hemorrhage throughout shift  Outcome: Progressing  Goal: Tolerate enteral feeding throughout shift  Outcome: Progressing  Goal: Decreased nausea/vomiting throughout shift  Outcome: Progressing  Goal: Controlled blood glucose throughout shift  Outcome: Progressing  Goal: Out of bed three times today  Outcome: Progressing     Problem: ICU Stroke  Goal: Maintain ICP within ordered limits throughout shift  Outcome: Progressing  Goal: Tolerate EVD clamping trial throughout shift  Outcome: Progressing  Goal: Tolerate ventilator weaning trial during shift  Outcome: Progressing  Goal: Maintain patent airway throughout shift  Outcome: Progressing  Goal: Achieve/maintain targeted sodium level throughout shift  Outcome: Progressing     Problem: Heart Failure  Goal: Improved gas exchange this shift  Outcome: Progressing  Goal: Improved urinary output this shift  Outcome: Progressing  Goal: Reduction in peripheral edema within 24 hours  Outcome: Progressing  Goal: Report improvement of dyspnea/breathlessness this shift  Outcome: Progressing  Goal: Weight from fluid excess reduced over 2-3 days, then stabilize  Outcome: Progressing  Goal: Increase self care and/or family involvement in 24 hours  Outcome: Progressing     Problem: Pain - Adult  Goal: Verbalizes/displays adequate comfort level or baseline comfort level  Outcome: Progressing     Problem:  Safety - Adult  Goal: Free from fall injury  Outcome: Progressing     Problem: Discharge Planning  Goal: Discharge to home or other facility with appropriate resources  Outcome: Progressing     Problem: Chronic Conditions and Co-morbidities  Goal: Patient's chronic conditions and co-morbidity symptoms are monitored and maintained or improved  Outcome: Progressing     Problem: Nutrition  Goal: Nutrient intake appropriate for maintaining nutritional needs  Outcome: Progressing     Problem: Skin  Goal: Decreased wound size/increased tissue granulation at next dressing change  Outcome: Progressing  Flowsheets (Taken 8/10/2025 0015)  Decreased wound size/increased tissue granulation at next dressing change:   Utilize specialty bed per algorithm   Protective dressings over bony prominences  Goal: Participates in plan/prevention/treatment measures  Outcome: Progressing  Flowsheets (Taken 8/10/2025 0015)  Participates in plan/prevention/treatment measures:   Increase activity/out of bed for meals   Elevate heels  Goal: Prevent/manage excess moisture  Outcome: Progressing  Flowsheets (Taken 8/10/2025 0015)  Prevent/manage excess moisture:   Use wicking fabric (obtain order)   Moisturize dry skin   Monitor for/manage infection if present  Goal: Prevent/minimize sheer/friction injuries  Outcome: Progressing  Flowsheets (Taken 8/10/2025 0015)  Prevent/minimize sheer/friction injuries:   Utilize specialty bed per algorithm   Use pull sheet   Turn/reposition every 2 hours/use positioning/transfer devices   Increase activity/out of bed for meals   HOB 30 degrees or less   Complete micro-shifts as needed if patient unable. Adjust patient position to relieve pressure points, not a full turn  Goal: Promote/optimize nutrition  Outcome: Progressing  Flowsheets (Taken 8/10/2025 0015)  Promote/optimize nutrition:   Offer water/supplements/favorite foods   Reassess MST if dietician not consulted   Monitor/record intake including  meals  Goal: Promote skin healing  Outcome: Progressing  Flowsheets (Taken 8/10/2025 0015)  Promote skin healing:   Turn/reposition every 2 hours/use positioning/transfer devices   Rotate device position/do not position patient on device   Protective dressings over bony prominences   Ensure correct size (line/device) and apply per  instructions   Assess skin/pad under line(s)/device(s)

## 2025-08-10 NOTE — PROGRESS NOTES
08/10/25 1059   Physical Activity   On average, how many days per week do you engage in moderate to strenuous exercise (like a brisk walk)? 0 days   On average, how many minutes do you engage in exercise at this level? 0 min   Financial Resource Strain   How hard is it for you to pay for the very basics like food, housing, medical care, and heating? Not hard   Housing Stability   In the last 12 months, was there a time when you were not able to pay the mortgage or rent on time? N   In the past 12 months, how many times have you moved where you were living? 0   At any time in the past 12 months, were you homeless or living in a shelter (including now)? N   Transportation Needs   In the past 12 months, has lack of transportation kept you from medical appointments or from getting medications? no   In the past 12 months, has lack of transportation kept you from meetings, work, or from getting things needed for daily living? No   Food Insecurity   Within the past 12 months, you worried that your food would run out before you got the money to buy more. Never true   Within the past 12 months, the food you bought just didn't last and you didn't have money to get more. Never true   Stress   Do you feel stress - tense, restless, nervous, or anxious, or unable to sleep at night because your mind is troubled all the time - these days? Not at all   Social Connections   In a typical week, how many times do you talk on the phone with family, friends, or neighbors? More than 3   How often do you get together with friends or relatives? Once   How often do you attend Denominational or Catholic services? Never   Do you belong to any clubs or organizations such as Denominational groups, unions, fraternal or athletic groups, or school groups? No   How often do you attend meetings of the clubs or organizations you belong to? Never   Are you , , , , never , or living with a partner?    Intimate Partner  Violence   Within the last year, have you been afraid of your partner or ex-partner? No   Within the last year, have you been humiliated or emotionally abused in other ways by your partner or ex-partner? No   Within the last year, have you been kicked, hit, slapped, or otherwise physically hurt by your partner or ex-partner? No   Within the last year, have you been raped or forced to have any kind of sexual activity by your partner or ex-partner? No   Alcohol Use   Q1: How often do you have a drink containing alcohol? Never   Q2: How many drinks containing alcohol do you have on a typical day when you are drinking? None   Q3: How often do you have six or more drinks on one occasion? Never   Utilities   In the past 12 months has the electric, gas, oil, or water company threatened to shut off services in your home? No   Health Literacy   How often do you need to have someone help you when you read instructions, pamphlets, or other written material from your doctor or pharmacy? Rarely   Follow-Ups   We make community resources available to all of our patients to assist with everyday needs. We may be able to connect you with those resources. Would you be interested? N

## 2025-08-10 NOTE — PROGRESS NOTES
"Karey Oakley \"marilyn\" is a 80 y.o. female on day 2 of admission presenting with Acute CVA (cerebrovascular accident) (Multi).      Subjective   Pt seen by bedside. Reports doing well.  Her speech is more fluent with improved naming capacity.   MRI with minimal stroke burden.     Spoke with daughter yesterday regarding pt's stroke. All questions answered.        Objective     Last Recorded Vitals  Blood pressure 136/51, pulse 64, temperature 36.1 °C (97 °F), temperature source Temporal, resp. rate 17, height 1.6 m (5' 2.99\"), weight 86.6 kg (190 lb 14.7 oz), SpO2 98%.         Physical Exam  Neurological Exam  AAOx3, follows commands, no aphasia/dysarthria. Naming improved markedly. Reading intact.   PERRL, VFF,  EOMI, normal saccades and pursuit, no gaze preference/nystagmus.   Intact facial sensation, no asymmetry. Intact hearing bilaterally. Tongue midline. Symmetric palate elevation.   Motor: 5/5 all four extremities.  Sensation: intact to light touch   Gait: deferred     Relevant Results      NIH Stroke Scale  1A. Level of Consciousness: Alert, Keenly Responsive  1B. Ask Month and Age: Both Questions Right  1C. Blink Eyes & Squeeze Hands: Performs Both Tasks  2. Best Gaze: Normal  3. Visual: No Visual Loss  4. Facial Palsy: Normal Symmetrical Movements  5A. Motor - Left Arm: No Drift  5B. Motor - Right Arm: No Drift  6A. Motor - Left Leg: No Drift  6B. Motor - Right Leg: No Drift  7. Limb Ataxia: Absent  8. Sensory Loss: Normal  9. Best Language: Mild-to-Moderate Aphasia  10. Dysarthria: Mild-to-Moderate Dysarthria  11. Extinction and Inattention: No Abnormality  NIH Stroke Scale: 2           [place]ita Coma Scale  Best Eye Response: Spontaneous  Best Verbal Response: Oriented  Best Motor Response: Follows commands  [place] Coma Scale Score: 15                                Assessment & Plan  Acute CVA (cerebrovascular accident) (Multi)    (HFpEF) heart failure with preserved ejection fraction    Anxiety and " "depression    CRF (chronic renal failure), stage 3 (moderate) (Multi)    Dyslipidemia    Essential hypertension    GERD (gastroesophageal reflux disease)    MCI (mild cognitive impairment)    Paroxysmal atrial fibrillation (Multi)    Aphasia    Acute UTI (urinary tract infection)    Syncope and collapse    Chronic pulmonary embolism (Multi)    Type 2 diabetes mellitus    Pneumonia of both upper lobes    Karey Oakley \"marilyn\" is a 80 y.o. Right-handed female with PMH of HTN, HLD, HFpEF, depression/anxiety who presented to [facility] on 8/8/2025 after being found down, found with LM1 cutoff, but with only low mismatch volume on CTP.      Pt's language has been rapidly improving. MRI with only minimal stroke burden.     Embolic stroke of unclear source -- pt would need holter monitor.         Stroke workup:   MRI: small scattered infarcts in LMCA territory   CTA: LM1 cutoff, no significant carotid stenosis   CTP: Tmax >6s only 5cc, Tmax >4s 20cc   Echo: normal EF, LA mildly dilated.  Bubble negative.   LDL: 67  HbA1C: 5.4 on 3/3/2025, updated lab 5.5      Diagnoses:  LMCA stroke, etiology r/o cardioembolic      Recommendations:  Continue DAPT (pt passed swallow), plan for 21 days then aspirin indefinitely   Stroke workup  Pt will need holter monitor on discharge   Continue home fenofibrate and home atorvastatin 40mg   sBP goal < 220  NPO until nurse bedside swallow assessment   Please utilize focused stroke order set   PT/OT/SLP as appropriate    Outpt follow up in stroke clinic in 1-2 months.    Buster Laguna MD   Neurology and Vascular Neurology       Vascular Risk Factor modification goals:  Blood pressure goals: avoid hypotension SBP <100 that could worsen cerebral perfusion, long term BP goal normotensive <130/80. Acute BP goals see above.   Lipid Goals: education on healthy diet and statin therapy to maintain or achieve goal LDL-cholesterol < 70mg.  Glucose Goals: early treatment of hyperglycemia to goal glucose " 140-180 mg/dl with long-term goal A1c < 7%   Smoking Cessation and Education  Assessment for Rehabilitation needs including PT/OT and if indicated swallow evaluation and speech therapy   Patient and family education on signs and symptoms of stroke, calling 911, risk factors, and healthy strategies for stroke prevention.        I spent 35 minutes in the professional and overall care of this patient.      Buster Laguna MD

## 2025-08-10 NOTE — DISCHARGE SUMMARY
Discharge Diagnosis  Acute CVA (cerebrovascular accident) (Multi)           Issues Requiring Follow-Up  Acute CVA.  Hypertension  Depression  Leg edema      Discharge Meds     Medication List      START taking these medications     acetaminophen 325 mg tablet; Commonly known as: Tylenol; Take 2 tablets   (650 mg) by mouth every 4 hours if needed for moderate pain (4 - 6) for up   to 5 days.   aspirin 81 mg EC tablet; Take 1 tablet (81 mg) by mouth once daily.;   Start taking on: August 11, 2025   clopidogrel 75 mg tablet; Commonly known as: Plavix; Take 1 tablet (75   mg) by mouth once daily.; Start taking on: August 11, 2025   polyethylene glycol 17 gram packet; Commonly known as: Glycolax,   Miralax; Take 17 g by mouth once daily as needed (Constipation).     CHANGE how you take these medications     furosemide 20 mg tablet; Commonly known as: Lasix; Take 0.5 tablets (10   mg) by mouth 3 (three) times a week. As needed for leg edema; Start taking   on: August 11, 2025; What changed: when to take this, additional   instructions     CONTINUE taking these medications     amLODIPine 10 mg tablet; Commonly known as: Norvasc   atorvastatin 40 mg tablet; Commonly known as: Lipitor   buPROPion  mg 12 hr tablet; Commonly known as: Wellbutrin SR   losartan 100 mg tablet; Commonly known as: Cozaar   metoprolol tartrate 100 mg tablet; Commonly known as: Lopressor   sertraline 100 mg tablet; Commonly known as: Zoloft     STOP taking these medications     allopurinol 100 mg tablet; Commonly known as: Zyloprim   APIXABAN ORAL   ciprofloxacin 250 mg tablet; Commonly known as: Cipro   estrogens (conjugated) vaginal cream; Commonly known as: Premarin   famotidine 20 mg tablet; Commonly known as: Pepcid   fenofibrate 54 mg tablet; Commonly known as: Tricor   Jardiance 10 mg tablet; Generic drug: empagliflozin   Macrobid 100 mg capsule; Generic drug: nitrofurantoin   (macrocrystal-monohydrate)   metFORMIN  mg 24 hr  tablet; Commonly known as: Glucophage-XR   omeprazole 20 mg DR capsule; Commonly known as: PriLOSEC   traMADol 50 mg tablet; Commonly known as: Ultram       Test Results Pending At Discharge  Pending Labs       Order Current Status    Extra Urine Gray Tube In process    Urinalysis with Reflex Culture and Microscopic In process            Hospital Course   Patient is an 80 years old male with past medical history of hypertension, anxiety, depression.  Patient presented to [facility] ER with change in mental status.  Patient was found on the floor.  Patient had right sided weakness.  She had aphasia.  Code brain attack was activated in the ER.  CT brain revealed a hyperdense left MCA sign is suspected  CT angio head and neck revealed abrupt cut off of the distal M1 segment of left MCA concerning for thrombosis.  Patient had multifocal groundglass opacities both lungs.  Teleneuro stroke team was contacted.  Last known well time was unknown.  Patient was not candidate for TNK or thrombolysis.  Teleneuro stroke team recommended aspirin and Plavix for 90 days.  Neuro was consulted.  She was evaluated by Dr. Laguna from neurology services.  Patient had MRI brain.  MRI brain revealed small subacute infarct in the left posterior basal ganglia.  2D echo did not reveal cardiac thrombus, normal EF.  Patient was started on statin.  PT OT evaluated the patient.  PT OT recommended high intensity level of care.  She was evaluated by speech therapy.  Patient will remain aphasic but she is able to talk with a short sentences.  Patient was found to have UTI, started on ceftriaxone for simple cystitis.  Discussed in detail with her family present in the room.  Patient is not on blood thinner.  She has been on blood thinner during COVID season few years ago.  Patient will be discharged on aspirin and Plavix for 90 days, statins.  She was advised to continue with blood pressure medication.  Hemoglobin A1c was 5.1.  According to her family  she is not on any medication for diabetes.  I reviewed the list of medication on discharge from [facility] and list of medication from last visit at geriatric office.  Patient is clinically hemodynamic stable to be discharged to skilled nursing facility when pre-CERT ready  Advised her to follow-up with Dr. Laguna in 2 weeks and her PCP in 2 weeks.  Patient will be discharged with Holter monitor.    Pertinent Physical Exam At Time of Discharge  Physical Exam  General: In non acute distress, cooperating during physical exam.  HEENT: Pupils are equal and reactive to light and commendation , oral mucosa moist, no JVD oral mucosa is moist.  Cardiovascular: Normal sinus rhythm, no MRG.  Lungs: Clear to auscultation bilaterally, no wheezing, no crackles, no dullness to percussion.  Abdomen: No hepatosplenomegaly appreciated, soft , not tender, positive bowel sounds, positive bowel movement.  Neuro: Alert and oriented x 2, aphasic, talk with short sentences   Musculoskeletal: No swelling in lower extremities, no limitation in range of motion.  Vascular: Pulses are intact in upper and lower extremities  Skin: No petechiae, ecchymosis or other stigmata for dermatology disease.     Outpatient Follow-Up    No future appointments.  Follow-up with Dr. Barros in 2 weeks  Follow-up with PCP in 2 weeks    Time spent discharging patient 40 minutes      Yolanda Gilliland MD

## 2025-08-10 NOTE — PROGRESS NOTES
"Karey Oakley \"mick" is a 80 y.o. female on day 2 of admission presenting with Acute CVA (cerebrovascular accident) (Multi).    Patient lives alone in a ranch style house with a basement (she does not use), three steps to enter. She uses a rollator. She is independent with ADLs, iADLs, and daily tasks. She does not drive, her daughter in law provides transportation. PCP is Shea Tinajero MD.   ADOD 08/11/2025  [facility] scores: PT-8, OT-11  RNCC provided list of SNF facilities. Will follow up for choices. Will need precert.   Patient does not have a safe discharge plan. Please speak with care coordinator prior to discharge.     Margaret Avendaño RN    "

## 2025-08-10 NOTE — CARE PLAN
Problem: General Stroke  Goal: Establish a mutual long term goal with patient by discharge  Outcome: Progressing  Goal: Demonstrate improvement in neurological exam throughout the shift  Outcome: Progressing  Goal: Maintain BP within ordered limits throughout shift  Outcome: Progressing  Goal: Participate in treatment (ie., meds, therapy) throughout shift  Outcome: Progressing  Goal: No symptoms of aspiration throughout shift  Outcome: Progressing  Goal: No symptoms of hemorrhage throughout shift  Outcome: Progressing  Goal: Tolerate enteral feeding throughout shift  Outcome: Progressing  Goal: Decreased nausea/vomiting throughout shift  Outcome: Progressing  Goal: Controlled blood glucose throughout shift  Outcome: Progressing  Goal: Out of bed three times today  Outcome: Progressing     Problem: ICU Stroke  Goal: Maintain ICP within ordered limits throughout shift  Outcome: Progressing  Goal: Tolerate EVD clamping trial throughout shift  Outcome: Progressing  Goal: Tolerate ventilator weaning trial during shift  Outcome: Progressing  Goal: Maintain patent airway throughout shift  Outcome: Progressing  Goal: Achieve/maintain targeted sodium level throughout shift  Outcome: Progressing     Problem: Heart Failure  Goal: Improved gas exchange this shift  Outcome: Progressing  Goal: Improved urinary output this shift  Outcome: Progressing  Goal: Reduction in peripheral edema within 24 hours  Outcome: Progressing  Goal: Report improvement of dyspnea/breathlessness this shift  Outcome: Progressing  Goal: Weight from fluid excess reduced over 2-3 days, then stabilize  Outcome: Progressing  Goal: Increase self care and/or family involvement in 24 hours  Outcome: Progressing     Problem: Safety - Adult  Goal: Free from fall injury  Outcome: Progressing     Problem: Discharge Planning  Goal: Discharge to home or other facility with appropriate resources  Outcome: Progressing     Problem: Chronic Conditions and  Co-morbidities  Goal: Patient's chronic conditions and co-morbidity symptoms are monitored and maintained or improved  Outcome: Progressing     Problem: Nutrition  Goal: Nutrient intake appropriate for maintaining nutritional needs  Outcome: Progressing     Problem: Skin  Goal: Decreased wound size/increased tissue granulation at next dressing change  Outcome: Progressing  Flowsheets (Taken 8/10/2025 1036)  Decreased wound size/increased tissue granulation at next dressing change:   Utilize specialty bed per algorithm   Promote sleep for wound healing  Goal: Participates in plan/prevention/treatment measures  Outcome: Progressing  Flowsheets (Taken 8/10/2025 1036)  Participates in plan/prevention/treatment measures:   Discuss with provider PT/OT consult   Elevate heels  Goal: Prevent/manage excess moisture  Outcome: Progressing  Flowsheets (Taken 8/10/2025 1036)  Prevent/manage excess moisture:   Cleanse incontinence/protect with barrier cream   Moisturize dry skin  Goal: Prevent/minimize sheer/friction injuries  Outcome: Progressing  Flowsheets (Taken 8/10/2025 1036)  Prevent/minimize sheer/friction injuries:   Complete micro-shifts as needed if patient unable. Adjust patient position to relieve pressure points, not a full turn   Use pull sheet   HOB 30 degrees or less   Turn/reposition every 2 hours/use positioning/transfer devices  Goal: Promote/optimize nutrition  Outcome: Progressing  Flowsheets (Taken 8/10/2025 1036)  Promote/optimize nutrition:   Consume > 50% meals/supplements   Offer water/supplements/favorite foods  Goal: Promote skin healing  Outcome: Progressing  Flowsheets (Taken 8/10/2025 1036)  Promote skin healing:   Assess skin/pad under line(s)/device(s)   Turn/reposition every 2 hours/use positioning/transfer devices   The patient's goals for the shift include Unable    The clinical goals for the shift include free from falls, free from returning neuro symptoms

## 2025-08-11 VITALS
BODY MASS INDEX: 33.83 KG/M2 | HEART RATE: 54 BPM | DIASTOLIC BLOOD PRESSURE: 50 MMHG | WEIGHT: 190.92 LBS | RESPIRATION RATE: 17 BRPM | HEIGHT: 63 IN | SYSTOLIC BLOOD PRESSURE: 128 MMHG | TEMPERATURE: 97.5 F | OXYGEN SATURATION: 96 %

## 2025-08-11 LAB
ANION GAP SERPL CALCULATED.3IONS-SCNC: 12 MMOL/L (ref 10–20)
BUN SERPL-MCNC: 12 MG/DL (ref 6–23)
CALCIUM SERPL-MCNC: 9.5 MG/DL (ref 8.6–10.3)
CHLORIDE SERPL-SCNC: 103 MMOL/L (ref 98–107)
CO2 SERPL-SCNC: 28 MMOL/L (ref 21–32)
CREAT SERPL-MCNC: 0.85 MG/DL (ref 0.5–1.05)
EGFRCR SERPLBLD CKD-EPI 2021: 69 ML/MIN/1.73M*2
ERYTHROCYTE [DISTWIDTH] IN BLOOD BY AUTOMATED COUNT: 14.6 % (ref 11.5–14.5)
GLUCOSE BLD MANUAL STRIP-MCNC: 122 MG/DL (ref 74–99)
GLUCOSE BLD MANUAL STRIP-MCNC: 144 MG/DL (ref 74–99)
GLUCOSE SERPL-MCNC: 104 MG/DL (ref 74–99)
HCT VFR BLD AUTO: 36.5 % (ref 36–46)
HGB BLD-MCNC: 11.6 G/DL (ref 12–16)
MCH RBC QN AUTO: 28.6 PG (ref 26–34)
MCHC RBC AUTO-ENTMCNC: 31.8 G/DL (ref 32–36)
MCV RBC AUTO: 90 FL (ref 80–100)
NRBC BLD-RTO: 0 /100 WBCS (ref 0–0)
PLATELET # BLD AUTO: 308 X10*3/UL (ref 150–450)
POTASSIUM SERPL-SCNC: 3.5 MMOL/L (ref 3.5–5.3)
RBC # BLD AUTO: 4.06 X10*6/UL (ref 4–5.2)
SODIUM SERPL-SCNC: 139 MMOL/L (ref 136–145)
WBC # BLD AUTO: 7.9 X10*3/UL (ref 4.4–11.3)

## 2025-08-11 PROCEDURE — 94760 N-INVAS EAR/PLS OXIMETRY 1: CPT

## 2025-08-11 PROCEDURE — 97530 THERAPEUTIC ACTIVITIES: CPT | Mod: GP

## 2025-08-11 PROCEDURE — 2500000004 HC RX 250 GENERAL PHARMACY W/ HCPCS (ALT 636 FOR OP/ED): Performed by: INTERNAL MEDICINE

## 2025-08-11 PROCEDURE — 36415 COLL VENOUS BLD VENIPUNCTURE: CPT | Performed by: INTERNAL MEDICINE

## 2025-08-11 PROCEDURE — 80048 BASIC METABOLIC PNL TOTAL CA: CPT | Performed by: INTERNAL MEDICINE

## 2025-08-11 PROCEDURE — 2500000001 HC RX 250 WO HCPCS SELF ADMINISTERED DRUGS (ALT 637 FOR MEDICARE OP): Performed by: STUDENT IN AN ORGANIZED HEALTH CARE EDUCATION/TRAINING PROGRAM

## 2025-08-11 PROCEDURE — 97535 SELF CARE MNGMENT TRAINING: CPT | Mod: GO

## 2025-08-11 PROCEDURE — 99232 SBSQ HOSP IP/OBS MODERATE 35: CPT

## 2025-08-11 PROCEDURE — 2060000001 HC INTERMEDIATE ICU ROOM DAILY

## 2025-08-11 PROCEDURE — 2500000001 HC RX 250 WO HCPCS SELF ADMINISTERED DRUGS (ALT 637 FOR MEDICARE OP)

## 2025-08-11 PROCEDURE — 85027 COMPLETE CBC AUTOMATED: CPT | Performed by: INTERNAL MEDICINE

## 2025-08-11 PROCEDURE — 2500000001 HC RX 250 WO HCPCS SELF ADMINISTERED DRUGS (ALT 637 FOR MEDICARE OP): Performed by: INTERNAL MEDICINE

## 2025-08-11 PROCEDURE — 82947 ASSAY GLUCOSE BLOOD QUANT: CPT

## 2025-08-11 PROCEDURE — 2500000002 HC RX 250 W HCPCS SELF ADMINISTERED DRUGS (ALT 637 FOR MEDICARE OP, ALT 636 FOR OP/ED)

## 2025-08-11 PROCEDURE — 99233 SBSQ HOSP IP/OBS HIGH 50: CPT

## 2025-08-11 PROCEDURE — 97116 GAIT TRAINING THERAPY: CPT | Mod: GP

## 2025-08-11 RX ORDER — POTASSIUM CHLORIDE 20 MEQ/1
20 TABLET, EXTENDED RELEASE ORAL
Status: DISCONTINUED | OUTPATIENT
Start: 2025-08-11 | End: 2025-08-12 | Stop reason: HOSPADM

## 2025-08-11 RX ORDER — FENOFIBRATE 54 MG/1
54 TABLET ORAL DAILY
Status: DISCONTINUED | OUTPATIENT
Start: 2025-08-11 | End: 2025-08-12 | Stop reason: HOSPADM

## 2025-08-11 RX ADMIN — CLOPIDOGREL 75 MG: 75 TABLET ORAL at 08:30

## 2025-08-11 RX ADMIN — HEPARIN SODIUM 5000 UNITS: 5000 INJECTION INTRAVENOUS; SUBCUTANEOUS at 13:37

## 2025-08-11 RX ADMIN — HEPARIN SODIUM 5000 UNITS: 5000 INJECTION INTRAVENOUS; SUBCUTANEOUS at 21:21

## 2025-08-11 RX ADMIN — FENOFIBRATE 54 MG: 54 TABLET ORAL at 13:43

## 2025-08-11 RX ADMIN — ATORVASTATIN CALCIUM 40 MG: 40 TABLET, FILM COATED ORAL at 21:21

## 2025-08-11 RX ADMIN — POTASSIUM CHLORIDE 20 MEQ: 1500 TABLET, EXTENDED RELEASE ORAL at 08:30

## 2025-08-11 RX ADMIN — ASPIRIN 81 MG: 81 TABLET, DELAYED RELEASE ORAL at 08:30

## 2025-08-11 RX ADMIN — POTASSIUM CHLORIDE 20 MEQ: 1500 TABLET, EXTENDED RELEASE ORAL at 18:36

## 2025-08-11 RX ADMIN — HEPARIN SODIUM 5000 UNITS: 5000 INJECTION INTRAVENOUS; SUBCUTANEOUS at 06:04

## 2025-08-11 ASSESSMENT — COGNITIVE AND FUNCTIONAL STATUS - GENERAL
MOBILITY SCORE: 16
TOILETING: A LOT
DRESSING REGULAR UPPER BODY CLOTHING: A LOT
DRESSING REGULAR LOWER BODY CLOTHING: TOTAL
STANDING UP FROM CHAIR USING ARMS: A LITTLE
MOVING TO AND FROM BED TO CHAIR: A LITTLE
MOVING FROM LYING ON BACK TO SITTING ON SIDE OF FLAT BED WITH BEDRAILS: A LITTLE
MOVING TO AND FROM BED TO CHAIR: A LITTLE
DAILY ACTIVITIY SCORE: 14
HELP NEEDED FOR BATHING: A LOT
MOVING FROM LYING ON BACK TO SITTING ON SIDE OF FLAT BED WITH BEDRAILS: A LITTLE
TURNING FROM BACK TO SIDE WHILE IN FLAT BAD: A LITTLE
TOILETING: A LITTLE
TURNING FROM BACK TO SIDE WHILE IN FLAT BAD: A LITTLE
MOBILITY SCORE: 16
HELP NEEDED FOR BATHING: A LOT
PERSONAL GROOMING: A LOT
WALKING IN HOSPITAL ROOM: A LITTLE
DRESSING REGULAR LOWER BODY CLOTHING: A LOT
STANDING UP FROM CHAIR USING ARMS: A LITTLE
EATING MEALS: A LITTLE
DAILY ACTIVITIY SCORE: 14
PERSONAL GROOMING: A LITTLE
CLIMB 3 TO 5 STEPS WITH RAILING: TOTAL
DRESSING REGULAR UPPER BODY CLOTHING: A LOT
CLIMB 3 TO 5 STEPS WITH RAILING: A LOT
WALKING IN HOSPITAL ROOM: A LOT

## 2025-08-11 ASSESSMENT — PAIN SCALES - GENERAL
PAINLEVEL_OUTOF10: 0 - NO PAIN

## 2025-08-11 ASSESSMENT — PAIN SCALES - PAIN ASSESSMENT IN ADVANCED DEMENTIA (PAINAD)
CONSOLABILITY: NO NEED TO CONSOLE
BREATHING: NORMAL
TOTALSCORE: 0
BODYLANGUAGE: RELAXED
FACIALEXPRESSION: SMILING OR INEXPRESSIVE

## 2025-08-11 ASSESSMENT — PAIN - FUNCTIONAL ASSESSMENT
PAIN_FUNCTIONAL_ASSESSMENT: 0-10
PAIN_FUNCTIONAL_ASSESSMENT: 0-10
PAIN_FUNCTIONAL_ASSESSMENT: FLACC (FACE, LEGS, ACTIVITY, CRY, CONSOLABILITY)
PAIN_FUNCTIONAL_ASSESSMENT: 0-10

## 2025-08-11 ASSESSMENT — ACTIVITIES OF DAILY LIVING (ADL): HOME_MANAGEMENT_TIME_ENTRY: 13

## 2025-08-11 NOTE — NURSING NOTE
Assumed care of patient.  Patient in bed resting.  Bedside shift report received.  Heart monitor showing SB with HR of 57.  Call light in reach.

## 2025-08-11 NOTE — CARE PLAN
The patient's goals for the shift include Unable    The clinical goals for the shift include remain fall free during shifts    Over the shift, the patient did not make progress toward the following goals. Barriers to progression include the patient. Recommendations to address these barriers include education.

## 2025-08-11 NOTE — PROGRESS NOTES
Spiritual Care Visit  Spiritual Care Request    Reason for Visit:  Routine Visit: Introduction     Request Received From:       Focus of Care:  Visited With: Patient not available         Refer to :          Spiritual Care Assessment    Spiritual Assessment:                      Care Provided:       Sense of Community and or Methodist Affiliation:  Yazdanism   Values/Beliefs  Spiritual Requests During Hospitalization: Rodri was having patient care today.     Addressed Needs/Concerns and/or Carlos Through:          Outcome:        Advance Directives:         Spiritual Care Annotation    Annotation:  Rodri was having patient care today.  .Kee Rodriguez

## 2025-08-11 NOTE — ASSESSMENT & PLAN NOTE
Pulmonary hypertension clinic note from July 1, 2025 [facility] stated  Presented to Dr. Joyce for evaluation of chronic PE, who noted improvement in left sided thrombotic burden but ongoing thrombotic burden on the right and an echo with RVSP 41. Subsequent imaging showed improvement/ resolution in chronic clot burden. Referred to PH clinic for further work up. Of note, he did not recommend anticoagulation.   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

## 2025-08-11 NOTE — CARE PLAN
Problem: General Stroke  Goal: Establish a mutual long term goal with patient by discharge  8/11/2025 0118 by Lexy Morse RN  Outcome: Progressing  8/11/2025 0117 by Lexy Morse RN  Outcome: Progressing  Goal: Demonstrate improvement in neurological exam throughout the shift  8/11/2025 0118 by Lexy Morse RN  Outcome: Progressing  8/11/2025 0117 by Lexy Morse RN  Outcome: Progressing  Goal: Maintain BP within ordered limits throughout shift  8/11/2025 0118 by Lexy Morse RN  Outcome: Progressing  8/11/2025 0117 by Lexy Morse RN  Outcome: Progressing  Goal: Participate in treatment (ie., meds, therapy) throughout shift  8/11/2025 0118 by Lexy Morse RN  Outcome: Progressing  8/11/2025 0117 by Lexy Morse RN  Outcome: Progressing  Goal: No symptoms of aspiration throughout shift  8/11/2025 0118 by Lexy Morse RN  Outcome: Progressing  8/11/2025 0117 by Lexy Morse RN  Outcome: Progressing  Goal: No symptoms of hemorrhage throughout shift  8/11/2025 0118 by Lexy Morse RN  Outcome: Progressing  8/11/2025 0117 by Lexy Morse RN  Outcome: Progressing  Goal: Tolerate enteral feeding throughout shift  8/11/2025 0118 by Lexy Morse RN  Outcome: Progressing  8/11/2025 0117 by Lexy Morse RN  Outcome: Progressing  Goal: Decreased nausea/vomiting throughout shift  8/11/2025 0118 by Lexy Morse RN  Outcome: Progressing  8/11/2025 0117 by Lexy Morse RN  Outcome: Progressing  Goal: Controlled blood glucose throughout shift  8/11/2025 0118 by Lexy Morse RN  Outcome: Progressing  8/11/2025 0117 by Lexy Morse RN  Outcome: Progressing  Goal: Out of bed three times today  8/11/2025 0118 by Lexy Morse RN  Outcome: Progressing  8/11/2025 0117 by Lexy Morse RN  Outcome: Progressing     Problem: ICU Stroke  Goal: Maintain ICP within ordered limits throughout shift  8/11/2025 0118 by Lexy Morse RN  Outcome: Progressing  8/11/2025 0117 by Lexy Morse RN  Outcome: Progressing  Goal:  Tolerate EVD clamping trial throughout shift  8/11/2025 0118 by Lexy Morse RN  Outcome: Progressing  8/11/2025 0117 by Lexy Morse RN  Outcome: Progressing  Goal: Tolerate ventilator weaning trial during shift  8/11/2025 0118 by Lexy Morse RN  Outcome: Progressing  8/11/2025 0117 by Lexy Morse RN  Outcome: Progressing  Goal: Maintain patent airway throughout shift  8/11/2025 0118 by Lexy Morse RN  Outcome: Progressing  8/11/2025 0117 by Lexy Morse RN  Outcome: Progressing  Goal: Achieve/maintain targeted sodium level throughout shift  8/11/2025 0118 by Lexy Morse RN  Outcome: Progressing  8/11/2025 0117 by Lexy Morse RN  Outcome: Progressing     Problem: Heart Failure  Goal: Improved gas exchange this shift  8/11/2025 0118 by Lexy Morse RN  Outcome: Progressing  8/11/2025 0117 by Lexy Mrose RN  Outcome: Progressing  Goal: Improved urinary output this shift  8/11/2025 0118 by Lexy Morse RN  Outcome: Progressing  8/11/2025 0117 by Lexy Morse RN  Outcome: Progressing  Goal: Reduction in peripheral edema within 24 hours  8/11/2025 0118 by Lexy Morse RN  Outcome: Progressing  8/11/2025 0117 by Lexy Morse RN  Outcome: Progressing  Goal: Report improvement of dyspnea/breathlessness this shift  8/11/2025 0118 by Lexy Morse RN  Outcome: Progressing  8/11/2025 0117 by Lexy Morse RN  Outcome: Progressing  Goal: Weight from fluid excess reduced over 2-3 days, then stabilize  8/11/2025 0118 by Lexy Morse RN  Outcome: Progressing  8/11/2025 0117 by Lexy Morse RN  Outcome: Progressing  Goal: Increase self care and/or family involvement in 24 hours  8/11/2025 0118 by Lexy Morse RN  Outcome: Progressing  8/11/2025 0117 by Lexy Morse RN  Outcome: Progressing     Problem: Pain - Adult  Goal: Verbalizes/displays adequate comfort level or baseline comfort level  8/11/2025 0118 by Lexy Morse RN  Outcome: Progressing  8/11/2025 0117 by Lexy Morse RN  Outcome: Progressing      Problem: Safety - Adult  Goal: Free from fall injury  8/11/2025 0118 by Lexy Morse RN  Outcome: Progressing  8/11/2025 0117 by Lexy Morse RN  Outcome: Progressing     Problem: Discharge Planning  Goal: Discharge to home or other facility with appropriate resources  8/11/2025 0118 by Lexy Morse RN  Outcome: Progressing  8/11/2025 0117 by Lexy Morse RN  Outcome: Progressing     Problem: Chronic Conditions and Co-morbidities  Goal: Patient's chronic conditions and co-morbidity symptoms are monitored and maintained or improved  8/11/2025 0118 by Lexy Morse RN  Outcome: Progressing  8/11/2025 0117 by Lexy Morse RN  Outcome: Progressing     Problem: Nutrition  Goal: Nutrient intake appropriate for maintaining nutritional needs  8/11/2025 0118 by Lexy Morse RN  Outcome: Progressing  8/11/2025 0117 by Lexy Morse RN  Outcome: Progressing     Problem: Skin  Goal: Decreased wound size/increased tissue granulation at next dressing change  8/11/2025 0118 by Lexy Morse RN  Outcome: Progressing  8/11/2025 0117 by Lexy Morse RN  Outcome: Progressing  Goal: Participates in plan/prevention/treatment measures  8/11/2025 0118 by Lexy Morse RN  Outcome: Progressing  8/11/2025 0117 by Lexy Morse RN  Outcome: Progressing  Goal: Prevent/manage excess moisture  8/11/2025 0118 by Lexy Morse RN  Outcome: Progressing  8/11/2025 0117 by Lexy Morse RN  Outcome: Progressing  Goal: Prevent/minimize sheer/friction injuries  8/11/2025 0118 by Lexy Morse RN  Outcome: Progressing  8/11/2025 0117 by Lexy Morse RN  Outcome: Progressing  Goal: Promote/optimize nutrition  8/11/2025 0118 by Lexy Morse RN  Outcome: Progressing  8/11/2025 0117 by Lexy Morse RN  Outcome: Progressing  Goal: Promote skin healing  8/11/2025 0118 by Lexy Morse RN  Outcome: Progressing  8/11/2025 0117 by Lexy Morse RN  Outcome: Progressing   The patient's goals for the shift include Unable    The clinical goals for the  shift include free from falls, free from returning neuro symptoms    Over the shift, the patient did not make progress toward the following goals. Barriers to progression include . Recommendations to address these barriers include .

## 2025-08-11 NOTE — PROGRESS NOTES
"Physical Therapy    Physical Therapy Treatment    Patient Name: Karey Oakley \"mick"  MRN: [id number]  Department: [facility] 3 E  Room: 04/04A  Today's Date: 8/11/2025  Time Calculation  Start Time: 1301  Stop Time: 1339  Time Calculation (min): 38 min         Assessment/Plan   PT Assessment  PT Assessment Results: Decreased strength, Decreased range of motion, Decreased endurance, Impaired balance, Decreased mobility, Decreased coordination, Decreased cognition, Impaired judgement, Decreased safety awareness, Impaired sensation  Rehab Prognosis: Good  Barriers to Discharge Home: Physical needs  Physical Needs: Stair navigation into home limited by function/safety  Evaluation/Treatment Tolerance: Patient tolerated treatment well  Medical Staff Made Aware: Yes  Strengths: Premorbid level of function  Barriers to Participation: Comorbidities  End of Session Communication: Bedside nurse  Assessment Comment: The patient is progressing well towards functional goals during hospital stay. Pt requires CGA to Jayy for transfers and ambulation with RW. Pt would continue to benefit from skilled therapy services to address functional deficits and assist in returning pt to [facility].  End of Session Patient Position: Up in chair, Alarm on  PT Plan  Inpatient/Swing Bed or Outpatient: Inpatient  PT Plan  Treatment/Interventions: Bed mobility, Transfer training, Gait training, Stair training, Balance training, Neuromuscular re-education, Neurodevelopmental intervention, Strengthening, Endurance training, Range of motion, Therapeutic exercise, Therapeutic activity, Home exercise program, Positioning, Postural re-education  PT Plan: Ongoing PT  PT Frequency: 6 times per week  PT Discharge Recommendations: High intensity level of continued care (Based on current functional status and rehab potential, patient is anticipated to tolerate and benefit from 5 or more days per week of skilled rehabilitative therapy after discharge from this " acute inpatient hospitalization)  Equipment Recommended upon Discharge: Wheeled walker  PT Recommended Transfer Status: Assist x1  PT - OK to Discharge: Yes    PT Visit Info:  PT Received On: 08/11/25  Response to Previous Treatment: Patient with no complaints from previous session.     General Visit Information:   General  Family/Caregiver Present: Yes  Caregiver Feedback: friend present then stepping out prior to mobility  Co-Treatment: OT  Co-Treatment Reason: To optimize pt safety and outcomes  Prior to Session Communication: Bedside nurse  Patient Position Received: Bed, 3 rail up, Alarm on  Preferred Learning Style: visual  General Comment: Cleared by nursing prior to session and pt agreeable to PT treatment.    Subjective   Precautions:  Precautions  Hearing/Visual Limitations: pt reports wearing glasses and bilateral hearing aids  Medical Precautions: Fall precautions  Precautions Comment: improved expressive aphasia since eval.      Vital Signs Comment: pt on room air     Objective   Pain:  Pain Assessment  Pain Assessment: 0-10  0-10 (Numeric) Pain Score: 0 - No pain  Cognition:  Cognition  Overall Cognitive Status: Within Functional Limits  Orientation Level: Oriented X4  Following Commands: Follows one step commands without difficulty  Insight: Mild  Impulsive: Mildly  Coordination:  Lower Body Coordination: Mild ataxia B/L R > L LLE with movement assess  Trunk Coordination: mild Rt trunk lean in unsupported sitting  Coordination Comment: B/L impairments RLE > LLE  Postural Control:  Postural Control  Posture Comment: Rt trunk lean in seated and standing with RW  Static Sitting Balance  Static Sitting-Balance Support: Bilateral upper extremity supported, Feet supported  Static Sitting-Level of Assistance: Close supervision  Static Sitting-Comment/Number of Minutes: EOB for approx. 3 min  Static Standing Balance  Static Standing-Balance Support: Bilateral upper extremity supported (RW)  Static  Standing-Level of Assistance: Minimum assistance  Static Standing-Comment/Number of Minutes: intermittent Rt trunk lean  Dynamic Standing Balance  Dynamic Standing-Balance Support: Bilateral upper extremity supported (RW)  Dynamic Standing-Level of Assistance: Minimum assistance  Dynamic Standing-Comments: Rt trunk lean; Rt toe out with decreased heel strike coordination  Extremity/Trunk Assessments:  RLE   RLE : Exceptions to WFL  Strength RLE  RLE Overall Strength: Deficits  R Hip Flexion: 3+/5  R Hip Extension: 3+/5  R Knee Flexion: 3+/5  R Knee Extension: 3/5  R Ankle Dorsiflexion: 3-/5  R Ankle Plantar Flexion: 3+/5  LLE   LLE : Within Functional Limits  Activity Tolerance:  Activity Tolerance  Endurance: Tolerates 10 - 20 min exercise with multiple rests  Activity Tolerance Comments: mild fatigue with mobility  Rate of Perceived Exertion (RPE): 4/10  Treatments:  Therapeutic Activity  Therapeutic Activity Performed: Yes  Therapeutic Activity 1: VCs for safe sequencing throughout mobility and use of restroom    Bed Mobility  Bed Mobility: Yes  Bed Mobility 1  Bed Mobility 1: Supine to sitting  Level of Assistance 1: Close supervision  Bed Mobility Comments 1: VCs for safe sequencing and hand placement    Ambulation/Gait Training  Ambulation/Gait Training Performed: Yes  Ambulation/Gait Training 1  Surface 1: Level tile  Device 1: Rolling walker  Assistance 1: Minimum assistance  Quality of Gait 1: Narrow base of support, Diminished heel strike, Decreased step length, Forward flexed posture  Comments/Distance (ft) 1: 30ftx2 with RW and Jayy; Rt trunk lean with Rt toe out and mild drag. VCs for increasing Rt heel strike with good follow through by patient.  Transfers  Transfer: Yes  Transfer 1  Transfer From 1: Sit to, Stand to  Transfer to 1: Stand, Sit  Technique 1: Sit to stand, Stand to sit  Transfer Device 1: Walker  Transfer Level of Assistance 1: Minimum assistance  Trials/Comments 1: VCs for safe hand  placement and sequencing; completed x 2 trials    Outcome Measures:  [facility] Basic Mobility  Turning from your back to your side while in a flat bed without using bedrails: A little  Moving from lying on your back to sitting on the side of a flat bed without using bedrails: A little  Moving to and from bed to chair (including a wheelchair): A little  Standing up from a chair using your arms (e.g. wheelchair or bedside chair): A little  To walk in hospital room: A little  Climbing 3-5 steps with railing: Total  Basic Mobility - Total Score: 16    Education Documentation  Precautions, taught by Dede Hills PT at 8/11/2025  2:48 PM.  Learner: Patient  Readiness: Acceptance  Method: Explanation  Response: Verbalizes Understanding  Comment: reviewed PT POC    Mobility Training, taught by Dede Hills PT at 8/11/2025  2:48 PM.  Learner: Patient  Readiness: Acceptance  Method: Explanation  Response: Verbalizes Understanding  Comment: reviewed PT POC    Education Comments  No comments found.      Encounter Problems       Encounter Problems (Active)       PT Problem       Strength (Progressing)       Start:  08/11/25    Expected End:  09/11/25       The patient will demonstrate an overall strength of 4/5 in BLE to assist with completion of functional mobility.           Functional Mobility (Progressing)       Start:  08/11/25    Expected End:  09/11/25       The patient will complete functional mobility (bed mobility, transfers, etc.) at a mod indep level with LRAD by DC.           Ambulation (Progressing)       Start:  08/11/25    Expected End:  09/11/25       The patient will be able to ambulate at a mod indep level for >[id number] with LRAD.          Balance (Progressing)       Start:  08/11/25    Expected End:  09/11/25       The patient will demonstrate good+ dynamic standing balance during activity with LRAD.                Encounter Problems (Resolved)       Mobility       STG - Patient will ambulate 50' x 1 with use  of 2WW and minimal assist.  (Met)       Start:  08/09/25    Expected End:  08/23/25    Resolved:  08/11/25            PT Transfers       STG - Transfer from bed to chair with LRD at minimal level.  (Met)       Start:  08/09/25    Expected End:  08/23/25    Resolved:  08/11/25         STG - Patient to transfer to and from sit to supine at close S level.  (Met)       Start:  08/09/25    Expected End:  08/23/25    Resolved:  08/11/25         STG - Patient will transfer sit to and from stand with use of 2WW at minimal assist level (Met)       Start:  08/09/25    Expected End:  08/23/25    Resolved:  08/11/25

## 2025-08-11 NOTE — PROGRESS NOTES
"Karey Oakley \"mick" is a 80 y.o. female on day 3 of admission presenting with Acute CVA (cerebrovascular accident) (Multi).      Subjective   Sitting up in bed. Denies pain or complaints       Objective     Last Recorded Vitals  /53 (BP Location: Left arm, Patient Position: Lying)   Pulse 88   Temp 36.6 °C (97.9 °F) (Temporal)   Resp 18   Wt 84.9 kg (187 lb 2.7 oz)   SpO2 100%   Intake/Output last 3 Shifts:    Intake/Output Summary (Last 24 hours) at 8/11/2025 1229  Last data filed at 8/11/2025 1125  Gross per 24 hour   Intake 770 ml   Output --   Net 770 ml       Admission Weight  Weight: 84.4 kg (186 lb 1.1 oz) (08/08/25 1717)    Daily Weight  08/11/25 : 84.9 kg (187 lb 2.7 oz)    Image Results  ECG 12 lead  Sinus bradycardia  Left ventricular hypertrophy with QRS widening ( R in aVL , [employer] product )  Abnormal ECG  No previous ECGs available      Physical Exam  Vitals and nursing note reviewed.   Constitutional:       Appearance: Normal appearance.   HENT:      Head: Normocephalic and atraumatic.      Right Ear: External ear normal.      Left Ear: External ear normal.      Nose: Nose normal.      Mouth/Throat:      Mouth: Mucous membranes are moist.     Eyes:      Extraocular Movements: Extraocular movements intact.      Conjunctiva/sclera: Conjunctivae normal.      Pupils: Pupils are equal, round, and reactive to light.       Cardiovascular:      Rate and Rhythm: Normal rate.      Pulses: Normal pulses.      Heart sounds: Normal heart sounds.   Pulmonary:      Effort: Pulmonary effort is normal.      Breath sounds: Normal breath sounds.   Abdominal:      Palpations: Abdomen is soft.     Musculoskeletal:         General: Normal range of motion.      Cervical back: Normal range of motion and neck supple.     Skin:     General: Skin is warm.      Capillary Refill: Capillary refill takes less than 2 seconds.     Neurological:      Mental Status: She is alert and oriented to person, place, and time. "     Psychiatric:         Mood and Affect: Mood normal.         Behavior: Behavior normal.         Relevant Results                Results for orders placed or performed during the hospital encounter of 08/08/25 (from the past 24 hours)   POCT GLUCOSE   Result Value Ref Range    POCT Glucose 122 (H) 74 - 99 mg/dL   CBC   Result Value Ref Range    WBC 7.9 4.4 - 11.3 x10*3/uL    nRBC 0.0 0.0 - 0.0 /100 WBCs    RBC 4.06 4.00 - 5.20 x10*6/uL    Hemoglobin 11.6 (L) 12.0 - 16.0 g/dL    Hematocrit 36.5 36.0 - 46.0 %    MCV 90 80 - 100 fL    MCH 28.6 26.0 - 34.0 pg    MCHC 31.8 (L) 32.0 - 36.0 g/dL    RDW 14.6 (H) 11.5 - 14.5 %    Platelets 308 150 - 450 x10*3/uL   Basic metabolic panel   Result Value Ref Range    Glucose 104 (H) 74 - 99 mg/dL    Sodium 139 136 - 145 mmol/L    Potassium 3.5 3.5 - 5.3 mmol/L    Chloride 103 98 - 107 mmol/L    Bicarbonate 28 21 - 32 mmol/L    Anion Gap 12 10 - 20 mmol/L    Urea Nitrogen 12 6 - 23 mg/dL    Creatinine 0.85 0.50 - 1.05 mg/dL    eGFR 69 >60 mL/min/1.73m*2    Calcium 9.5 8.6 - 10.3 mg/dL        Results for orders placed or performed during the hospital encounter of 08/08/25 (from the past 24 hours)   POCT GLUCOSE   Result Value Ref Range    POCT Glucose 122 (H) 74 - 99 mg/dL   CBC   Result Value Ref Range    WBC 7.9 4.4 - 11.3 x10*3/uL    nRBC 0.0 0.0 - 0.0 /100 WBCs    RBC 4.06 4.00 - 5.20 x10*6/uL    Hemoglobin 11.6 (L) 12.0 - 16.0 g/dL    Hematocrit 36.5 36.0 - 46.0 %    MCV 90 80 - 100 fL    MCH 28.6 26.0 - 34.0 pg    MCHC 31.8 (L) 32.0 - 36.0 g/dL    RDW 14.6 (H) 11.5 - 14.5 %    Platelets 308 150 - 450 x10*3/uL   Basic metabolic panel   Result Value Ref Range    Glucose 104 (H) 74 - 99 mg/dL    Sodium 139 136 - 145 mmol/L    Potassium 3.5 3.5 - 5.3 mmol/L    Chloride 103 98 - 107 mmol/L    Bicarbonate 28 21 - 32 mmol/L    Anion Gap 12 10 - 20 mmol/L    Urea Nitrogen 12 6 - 23 mg/dL    Creatinine 0.85 0.50 - 1.05 mg/dL    eGFR 69 >60 mL/min/1.73m*2    Calcium 9.5 8.6 - 10.3  mg/dL        Assessment & Plan  Acute CVA (cerebrovascular accident) (Multi)  MRI Brain reviewed: small subacute infarct in L-posterior basal ganglia  TTE reviewed   Neurology consultation appreciate recs.  PT OT SLP  ASA/Plavix/Statin  Neuro checks  Aphasia  Appears resolving   Management as above  Acute UTI (urinary tract infection)  Does have hx recurrent UTI  Urine culture w/contaminant  Asymptomatic  Discontinue ABX  Chronic pulmonary embolism (Multi)  Pulmonary hypertension clinic note from July 1, 2025 CC stated  Presented to Dr. Joyce for evaluation of chronic PE, who noted improvement in left sided thrombotic burden but ongoing thrombotic burden on the right and an echo with RVSP 41. Subsequent imaging showed improvement/ resolution in chronic clot burden. Referred to PH clinic for further work up. Of note, he did not recommend anticoagulation.   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   Patient was discharged from [facility] on June 19, 2025 without Eliquis  Pneumonia of both upper lobes  Incidental finding on CT angio head and neck   Stable on RA  Monitor 02  (HFpEF) heart failure with preserved ejection fraction  Doesn't seem to be in any acute exacerbation  Continue home lasix  Monitor  Type 2 diabetes mellitus  POCT every 6 hourly  Insulin sliding scale  Patient's recent SGL2Ti recently discontinued to recurrent UTI  CRF (chronic renal failure), stage 3 (moderate) (Multi)  Avoid nephrotoxic agents  MCI (mild cognitive impairment)    Anxiety and depression  Does not take anything at home for this currently   Dyslipidemia  Continue statin   Essential hypertension  Continue to monitor BP and adjust antihypertensive medications accordingly  GERD (gastroesophageal reflux disease)  Continue PPI    Disposition: SNF pending arrangements         Zuleima Dotson, APRN-CNP

## 2025-08-11 NOTE — SIGNIFICANT EVENT
08/10/25 1302   PT  Visit   PT Received On 08/10/25   General   Family/Caregiver Present Yes   Caregiver Feedback Son present   Prior to Session Communication Bedside nurse   Patient Position Received Bed, 3 rail up;Alarm on   General Comment Cleared by nursing to be seen for therapy, pt agreeable with tx, supine in bed upon arrival.   Precautions   Medical Precautions Fall precautions   Pain Assessment   Pain Assessment 0-10   0-10 (Numeric) Pain Score 0 - No pain   Cognition   Overall Cognitive Status WFL   Orientation Level Oriented X4   Static Sitting Balance   Static Sitting-Balance Support Bilateral upper extremity supported;Feet supported   Static Sitting-Level of Assistance Close supervision   Static Sitting-Comment/Number of Minutes Good seated static balance   Static Standing Balance   Static Standing-Balance Support Bilateral upper extremity supported   Static Standing-Level of Assistance Minimum assistance   Static Standing-Comment/Number of Minutes Fair static standing balance with bilateral UE support on walker   Bed Mobility   Bed Mobility Yes   Bed Mobility 1   Bed Mobility 1 Supine to sitting   Level of Assistance 1 Close supervision   Bed Mobility Comments 1 Increased time and effort to complete bed mobility with HOB elevated and use of bedrail   Transfers   Transfer Yes   Transfer 1   Transfer From 1 Sit to   Transfer to 1 Stand   Technique 1 Sit to stand;Stand to sit   Transfer Device 1 Walker   Transfer Level of Assistance 1 Minimum assistance   Trials/Comments 1 Min assist for trunk up during sit to stand, cues for proper hand placement, decreased eccentric control in sitting.   Ambulation/Gait Training   Ambulation/Gait Training Performed Yes   Ambulation/Gait Training 1   Surface 1 Level tile   Device 1 Rolling walker   Assistance 1 Minimum assistance   Quality of Gait 1 NBOS;Diminished heel strike;Decreased step length;Forward flexed posture   Comments/Distance (ft) 1 20' with wheeled  walker, foot drop noted on right, mild antalgic gait pattern, mild right knee instability, min assist for balance,   Stairs   Stairs No   PT Assessment   Barriers to Discharge Home Physical needs   Physical Needs Stair navigation into home limited by function/safety   End of Session Communication Bedside nurse   Assessment Comment Pt continues to present with mild strength, balance and endurance deficits. Pt would continued to benefit from skilled therapy services.   End of Session Patient Position Up in chair;Alarm off, caregiver present  (RN notified, son present)     Therapy session completed by Franny Zavala PTA on 8/10/25; note pulled through by Dede Hills PT, DTP on 8/11/25.

## 2025-08-11 NOTE — ASSESSMENT & PLAN NOTE
MRI Brain reviewed: small subacute infarct in L-posterior basal ganglia  TTE reviewed   Neurology consultation appreciate recs.  PT OT SLP  ASA/Plavix/Statin  Neuro checks

## 2025-08-11 NOTE — PROGRESS NOTES
08/11/25 1333   Discharge Planning   Expected Discharge Disposition Rehab   Does the patient need discharge transport arranged? Yes   [facility] coordination needed? Yes     Patient accepted at [facility].     Information sent to direct submit team to initiate precert.

## 2025-08-11 NOTE — CARE PLAN
Problem: General Stroke  Goal: Establish a mutual long term goal with patient by discharge  Outcome: Progressing  Goal: Demonstrate improvement in neurological exam throughout the shift  Outcome: Progressing  Goal: Maintain BP within ordered limits throughout shift  Outcome: Progressing  Goal: Participate in treatment (ie., meds, therapy) throughout shift  Outcome: Progressing  Goal: No symptoms of aspiration throughout shift  Outcome: Progressing  Goal: No symptoms of hemorrhage throughout shift  Outcome: Progressing  Goal: Tolerate enteral feeding throughout shift  Outcome: Progressing  Goal: Decreased nausea/vomiting throughout shift  Outcome: Progressing  Goal: Controlled blood glucose throughout shift  Outcome: Progressing  Goal: Out of bed three times today  Outcome: Progressing     Problem: ICU Stroke  Goal: Maintain ICP within ordered limits throughout shift  Outcome: Progressing  Goal: Tolerate EVD clamping trial throughout shift  Outcome: Progressing  Goal: Tolerate ventilator weaning trial during shift  Outcome: Progressing  Goal: Maintain patent airway throughout shift  Outcome: Progressing  Goal: Achieve/maintain targeted sodium level throughout shift  Outcome: Progressing     Problem: Heart Failure  Goal: Improved gas exchange this shift  Outcome: Progressing  Goal: Improved urinary output this shift  Outcome: Progressing  Goal: Reduction in peripheral edema within 24 hours  Outcome: Progressing  Goal: Report improvement of dyspnea/breathlessness this shift  Outcome: Progressing  Goal: Weight from fluid excess reduced over 2-3 days, then stabilize  Outcome: Progressing  Goal: Increase self care and/or family involvement in 24 hours  Outcome: Progressing     Problem: Pain - Adult  Goal: Verbalizes/displays adequate comfort level or baseline comfort level  Outcome: Progressing     Problem: Safety - Adult  Goal: Free from fall injury  Outcome: Progressing     Problem: Discharge Planning  Goal: Discharge  to home or other facility with appropriate resources  Outcome: Progressing     Problem: Chronic Conditions and Co-morbidities  Goal: Patient's chronic conditions and co-morbidity symptoms are monitored and maintained or improved  Outcome: Progressing     Problem: Nutrition  Goal: Nutrient intake appropriate for maintaining nutritional needs  Outcome: Progressing     Problem: Skin  Goal: Decreased wound size/increased tissue granulation at next dressing change  Outcome: Progressing  Goal: Participates in plan/prevention/treatment measures  Outcome: Progressing  Goal: Prevent/manage excess moisture  Outcome: Progressing  Goal: Prevent/minimize sheer/friction injuries  Outcome: Progressing  Goal: Promote/optimize nutrition  Outcome: Progressing  Goal: Promote skin healing  Outcome: Progressing   The patient's goals for the shift include Unable    The clinical goals for the shift include free from falls, free from returning neuro symptoms    Over the shift, the patient did not make progress toward the following goals. Barriers to progression include . Recommendations to address these barriers include .

## 2025-08-11 NOTE — PROGRESS NOTES
08/11/25 1030   Discharge Planning   Home or Post Acute Services Post acute facilities (Rehab/SNF/etc)   Type of Post Acute Facility Services Rehab   Expected Discharge Disposition Rehab   Does the patient need discharge transport arranged? Yes   [facility] coordination needed? Yes     Met with patient at bedside to discuss discharge planning.  PT recommendation is for high intensity rehab.  Patient is agreeable to acute rehab but defers choice to her daughter-in-law Nava.     Call placed to Nava at [phone].  Choice of [facility] Acute rehab was received.  Referral sent in [facility]. Will require a precert.

## 2025-08-11 NOTE — PROGRESS NOTES
"Karey Oakley \"marilyn\" is a 80 y.o. female on day 3 of admission presenting with Acute CVA (cerebrovascular accident) (Multi).      Subjective   Pt seen by bedside. Pt states that  her speech is much better and feels like it is almost back to normal.     Spoke with daughter yesterday regarding pt's stroke. All questions answered.        Objective     Last Recorded Vitals  Blood pressure 118/53, pulse 89, temperature 36.6 °C (97.9 °F), temperature source Temporal, resp. rate 18, height 1.6 m (5' 2.99\"), weight 84.9 kg (187 lb 2.7 oz), SpO2 100%.       Physical Exam  Neurological Exam  Neurological Exam:  General: NAD, cooperative   Neuro:  Awake alert, naming remarkably improved, reading intact, repeating sentence intact, no dysarthria ( occasionally still having trouble coming up with a word)  Visual fields full  EOM full range, letitia 3-4 mm, No nystagmus   Smile sym  Tongue midline   STRENGTH:  R  L  Deltoid   5  5  Elbow flex   5  5  Elbox ext   5  5  Hand     5 5  Hip flexion  5-  5  Knee ext 5  5  Knee flexion 5  5  DorsiFlex  5  5  PlantarFlex  5  5  Tone and bulk normal   Reflexes:      R          L  BR:               2          2  Biceps:         2          2  Knee:           2          2  Ankle:          2          2  Toes down  Sensory normal  FTN normal  Gait: deferred       Relevant Results      NIH Stroke Scale  1A. Level of Consciousness: Alert, Keenly Responsive  1B. Ask Month and Age: Both Questions Right  1C. Blink Eyes & Squeeze Hands: Performs Both Tasks  2. Best Gaze: Normal  3. Visual: No Visual Loss  4. Facial Palsy: Normal Symmetrical Movements  5A. Motor - Left Arm: No Drift  5B. Motor - Right Arm: No Drift  6A. Motor - Left Leg: No Drift  6B. Motor - Right Leg: No Drift  7. Limb Ataxia: Absent  8. Sensory Loss: Normal  9. Best Language: Mild-to-Moderate Aphasia  10. Dysarthria: Mild-to-Moderate Dysarthria  11. Extinction and Inattention: No Abnormality  NIH Stroke Scale: 2         " "  Nikita Coma Scale  Best Eye Response: Spontaneous  Best Verbal Response: Oriented  Best Motor Response: Follows commands  Criders Coma Scale Score: 15                  Assessment & Plan  Acute CVA (cerebrovascular accident) (Multi)    (HFpEF) heart failure with preserved ejection fraction    Anxiety and depression    CRF (chronic renal failure), stage 3 (moderate) (Multi)    Dyslipidemia    Essential hypertension    GERD (gastroesophageal reflux disease)    MCI (mild cognitive impairment)    Paroxysmal atrial fibrillation (Multi)    Aphasia    Acute UTI (urinary tract infection)    Syncope and collapse    Chronic pulmonary embolism (Multi)    Type 2 diabetes mellitus    Pneumonia of both upper lobes    Karey Oakley \"marilyn\" is a 80 y.o. Right-handed female with PMH of HTN, HLD, HFpEF, depression/anxiety who presented to [facility] on 8/8/[facility] after being found down, found with LM1 cutoff, but with only low mismatch volume on CTP.      8/11/25- Pt's language continuing to rapidly improve, almost back to baseline per pt. MRI with only minimal stroke burden. Stroke work up has been completed. Pt is cleared for discharge from a neurology standpoint.     Diagnosis:  Embolic stroke of unclear source -- pt would need holter monitor.      Stroke workup:   MRI: small scattered infarcts in LMCA territory   CTA: LM1 cutoff, no significant carotid stenosis   CTP: Tmax >6s only 5cc, Tmax >4s 20cc   Echo: normal EF, LA mildly dilated. Bubble negative.   LDL: 67  HbA1C: 5.4 on 3/3/2025, updated lab 5.5      Diagnoses:  LMCA stroke, etiology r/o cardioembolic      Recommendations:  Continue DAPT, plan for 21 days then aspirin indefinitely   Stroke workup  Pt will need holter monitor on discharge   Continue home fenofibrate and home atorvastatin 40mg   sBP goal < 220  Please utilize focused stroke order set   PT/OT/SLP as appropriate  Dispo planning, from neurology standpoint would recommend acute rehab.   Follow with me in " clinic in 1-2 months. Please call [phone] for follow up appointment.       Thank you for the consult. Will sign off. Please do not hesitate to reach out with any questions or any new change in patient's neurological status.      Vascular Risk Factor modification goals:  Blood pressure goals: avoid hypotension SBP <100 that could worsen cerebral perfusion, long term BP goal normotensive <130/80. Acute BP goals see above.   Lipid Goals: education on healthy diet and statin therapy to maintain or achieve goal LDL-cholesterol < 70mg.  Glucose Goals: early treatment of hyperglycemia to goal glucose 140-180 mg/dl with long-term goal A1c < 7%   Smoking Cessation and Education  Assessment for Rehabilitation needs including PT/OT and if indicated swallow evaluation and speech therapy   Patient and family education on signs and symptoms of stroke, calling 911, risk factors, and healthy strategies for stroke prevention.      I spent 35 minutes in the professional and overall care of this patient. Discussed pt and plan with Dr Presley.       Deanna Cortés, APRN-CNP

## 2025-08-11 NOTE — PROGRESS NOTES
"Occupational Therapy    OT Treatment    Patient Name: Karey Oakley \"mick"  MRN: [id number]  Department: [facility] 3 E  Room: 04/04A  Today's Date: 8/11/2025  Time Calculation  Start Time: 1300  Stop Time: 1323  Time Calculation (min): 23 min        Assessment:  OT Assessment: Pt is demonstrating significant overall functional progress with communication, ADL's, and functional transfers/mobility. Continue POC.  Prognosis: Good  Barriers to Discharge Home: Caregiver assistance, Physical needs  Caregiver Assistance: Patient lives alone and/or does not have reliable caregiver assistance  Physical Needs: 24hr mobility assistance needed, Intermittent ADL assistance needed  Evaluation/Treatment Tolerance: Patient tolerated treatment well  End of Session Communication: Bedside nurse  End of Session Patient Position: Up in chair, Alarm on (Needs in reach.)  Strengths: Premorbid level of function, Support of extended family/friends  Barriers to Participation: Comorbidities    Plan:  Treatment Interventions: ADL retraining, Functional transfer training, UE strengthening/ROM, Endurance training, Cognitive reorientation, Patient/family training, Equipment evaluation/education, Neuromuscular reeducation, Fine motor coordination activities, Compensatory technique education  OT Frequency: 5 times per week (during this acute inpatient hospitalization)  OT Discharge Recommendations: High intensity level of continued care, Other (Comment) (Based on current functional status and rehab potential, patient is anticipated to tolerate and benefit from 5 or more days per week of skilled rehabilitative therapy after discharge from this acute inpatient hospitalization.)  Equipment Recommended upon Discharge: Wheeled walker  OT Recommended Transfer Status: Moderate assist  OT - OK to Discharge: Yes      Subjective     OT Visit Info:  OT Received On: 08/11/25    General Visit Info:  General  Family/Caregiver Present: No  Co-Treatment: " PT  Co-Treatment Reason: To optimize pt safety and outcomes  Prior to Session Communication: Bedside nurse  Patient Position Received: Bed, 3 rail up, Alarm on  General Comment: Cleared by nursing prior to session and pt agreeable to OT treatment.    Precautions:  Hearing/Visual Limitations: pt reports wearing glasses and bilateral hearing aids  Medical Precautions: Fall precautions  Precautions Comment: Pt with much improved expressive aphasia since eval.    Pain:  Pain Assessment  Pain Assessment: FLACC (Face, Legs, Activity, Cry, Consolability)  0-10 (Numeric) Pain Score: 0 - No pain    Objective      Cognition:  Cognition  Overall Cognitive Status: Within Functional Limits  Orientation Level: Oriented X4  Following Commands: Follows one step commands without difficulty  Cognition Comments: Pt with much improved communication skills and only demonstrating mild deficits with expressive aphasia compared to on eval.    Activities of Daily Living: Grooming  Grooming Level of Assistance: Close supervision, Setup  Grooming Where Assessed: Standing sinkside  Grooming Comments: Pt washed her hands, brushed her teeth, and used oral rinse all with supervision for safety after setup.    Toileting  Toileting Level of Assistance: Close supervision  Where Assessed: Toilet  Toileting Comments: Pt able to complete her whole routine in the bathroom with close S for safety and due to impaired balance.    Bed Mobility/Transfers:      Transfers  Transfer: Yes  Transfer 1  Transfer From 1: Stand to  Transfer to 1: Chair with arms  Technique 1: Stand to sit  Transfer Device 1: Walker  Transfer Level of Assistance 1: Contact guard, Minimal verbal cues  Trials/Comments 1: CGA for balance/safety.    Toilet Transfers  Toilet Transfer From: Rolling walker  Toilet Transfer Type: To and from  Toilet Transfer to: Standard toilet  Toilet Transfer Technique: Ambulating  Toilet Transfers: Contact guard  Toilet Transfers Comments: CGA for  balance/safety.    Functional Mobility:  Functional Mobility  Functional Mobility Performed: Yes  Functional Mobility 1  Device 1: Rolling walker  Assistance 1: Contact guard, Minimal verbal cues  Comments 1: Pt tolerated functional mobility for a household distance with CGA for balance/safety and verbal cues as needed.      Outcome Measures:[facility] Daily Activity  Putting on and taking off regular lower body clothing: Total  Bathing (including washing, rinsing, drying): A lot  Putting on and taking off regular upper body clothing: A lot  Toileting, which includes using toilet, bedpan or urinal: A little  Taking care of personal grooming such as brushing teeth: A little  Eating Meals: A little  Daily Activity - Total Score: 14    Education Documentation  No documentation found.  Education Comments  No comments found.    Goals:  Encounter Problems       Encounter Problems (Active)       OT Goals       Pt will complete self-feeding with mod indep. (Progressing)       Start:  08/09/25    Expected End:  09/09/25            Pt will complete all grooming tasks with CGA in standing with walker. (Progressing)       Start:  08/09/25    Expected End:  09/09/25            Pt will complete UB dressing/bathing with setup and LB dressing/bathing with min A using adaptive equipment as needed.  (Progressing)       Start:  08/09/25    Expected End:  09/09/25            Pt will complete all toileting tasks with CGA. (Progressing)       Start:  08/09/25    Expected End:  09/09/25            Pt will complete all functional transfers and mobility with CGA using a RW. (Progressing)       Start:  08/09/25    Expected End:  09/09/25

## 2025-08-12 ENCOUNTER — APPOINTMENT (OUTPATIENT)
Dept: CARDIOLOGY | Facility: HOSPITAL | Age: 80
DRG: 064 | End: 2025-08-12
Payer: MEDICARE

## 2025-08-12 ENCOUNTER — HOSPITAL ENCOUNTER (INPATIENT)
Facility: HOSPITAL | Age: 80
DRG: 057 | End: 2025-08-12
Attending: INTERNAL MEDICINE | Admitting: INTERNAL MEDICINE
Payer: MEDICARE

## 2025-08-12 VITALS
OXYGEN SATURATION: 100 % | HEIGHT: 63 IN | DIASTOLIC BLOOD PRESSURE: 89 MMHG | SYSTOLIC BLOOD PRESSURE: 141 MMHG | BODY MASS INDEX: 33.2 KG/M2 | HEART RATE: 96 BPM | WEIGHT: 187.39 LBS | TEMPERATURE: 97.7 F | RESPIRATION RATE: 17 BRPM

## 2025-08-12 DIAGNOSIS — I63.9 ACUTE CVA (CEREBROVASCULAR ACCIDENT) (MULTI): ICD-10-CM

## 2025-08-12 DIAGNOSIS — I63.9 ISCHEMIC STROKE (MULTI): Primary | ICD-10-CM

## 2025-08-12 LAB
ANION GAP SERPL CALCULATED.3IONS-SCNC: 10 MMOL/L (ref 10–20)
BODY SURFACE AREA: 1.94 M2
BUN SERPL-MCNC: 13 MG/DL (ref 6–23)
CALCIUM SERPL-MCNC: 9.4 MG/DL (ref 8.6–10.3)
CHLORIDE SERPL-SCNC: 106 MMOL/L (ref 98–107)
CO2 SERPL-SCNC: 26 MMOL/L (ref 21–32)
CREAT SERPL-MCNC: 0.83 MG/DL (ref 0.5–1.05)
EGFRCR SERPLBLD CKD-EPI 2021: 71 ML/MIN/1.73M*2
ERYTHROCYTE [DISTWIDTH] IN BLOOD BY AUTOMATED COUNT: 14.6 % (ref 11.5–14.5)
GLUCOSE BLD MANUAL STRIP-MCNC: 118 MG/DL (ref 74–99)
GLUCOSE SERPL-MCNC: 95 MG/DL (ref 74–99)
HCT VFR BLD AUTO: 35.3 % (ref 36–46)
HGB BLD-MCNC: 11.6 G/DL (ref 12–16)
MCH RBC QN AUTO: 28.4 PG (ref 26–34)
MCHC RBC AUTO-ENTMCNC: 32.9 G/DL (ref 32–36)
MCV RBC AUTO: 86 FL (ref 80–100)
NRBC BLD-RTO: 0 /100 WBCS (ref 0–0)
PLATELET # BLD AUTO: 303 X10*3/UL (ref 150–450)
POTASSIUM SERPL-SCNC: 4.1 MMOL/L (ref 3.5–5.3)
RBC # BLD AUTO: 4.09 X10*6/UL (ref 4–5.2)
SODIUM SERPL-SCNC: 138 MMOL/L (ref 136–145)
WBC # BLD AUTO: 7.3 X10*3/UL (ref 4.4–11.3)

## 2025-08-12 PROCEDURE — 82947 ASSAY GLUCOSE BLOOD QUANT: CPT

## 2025-08-12 PROCEDURE — 2500000001 HC RX 250 WO HCPCS SELF ADMINISTERED DRUGS (ALT 637 FOR MEDICARE OP): Performed by: STUDENT IN AN ORGANIZED HEALTH CARE EDUCATION/TRAINING PROGRAM

## 2025-08-12 PROCEDURE — 93246 EXT ECG>7D<15D RECORDING: CPT

## 2025-08-12 PROCEDURE — 2500000001 HC RX 250 WO HCPCS SELF ADMINISTERED DRUGS (ALT 637 FOR MEDICARE OP): Performed by: INTERNAL MEDICINE

## 2025-08-12 PROCEDURE — 85027 COMPLETE CBC AUTOMATED: CPT | Performed by: INTERNAL MEDICINE

## 2025-08-12 PROCEDURE — 1180000001 HC REHAB PRIVATE ROOM DAILY

## 2025-08-12 PROCEDURE — 2500000002 HC RX 250 W HCPCS SELF ADMINISTERED DRUGS (ALT 637 FOR MEDICARE OP, ALT 636 FOR OP/ED)

## 2025-08-12 PROCEDURE — 2500000001 HC RX 250 WO HCPCS SELF ADMINISTERED DRUGS (ALT 637 FOR MEDICARE OP)

## 2025-08-12 PROCEDURE — 92507 TX SP LANG VOICE COMM INDIV: CPT | Mod: GN

## 2025-08-12 PROCEDURE — 99239 HOSP IP/OBS DSCHRG MGMT >30: CPT

## 2025-08-12 PROCEDURE — 2500000004 HC RX 250 GENERAL PHARMACY W/ HCPCS (ALT 636 FOR OP/ED): Performed by: INTERNAL MEDICINE

## 2025-08-12 PROCEDURE — 36415 COLL VENOUS BLD VENIPUNCTURE: CPT | Performed by: INTERNAL MEDICINE

## 2025-08-12 PROCEDURE — 80048 BASIC METABOLIC PNL TOTAL CA: CPT | Performed by: INTERNAL MEDICINE

## 2025-08-12 RX ORDER — CLOPIDOGREL BISULFATE 75 MG/1
75 TABLET ORAL DAILY
Status: ON HOLD
Start: 2025-08-12 | End: 2025-09-02

## 2025-08-12 RX ORDER — FENOFIBRATE 54 MG/1
54 TABLET ORAL DAILY
Status: ON HOLD
Start: 2025-08-13

## 2025-08-12 RX ORDER — BUPROPION HYDROCHLORIDE 150 MG/1
150 TABLET, EXTENDED RELEASE ORAL
Status: DISCONTINUED | OUTPATIENT
Start: 2025-08-13 | End: 2025-08-26 | Stop reason: HOSPADM

## 2025-08-12 RX ORDER — ACETAMINOPHEN 160 MG/5ML
650 SOLUTION ORAL EVERY 4 HOURS PRN
Status: DISCONTINUED | OUTPATIENT
Start: 2025-08-12 | End: 2025-08-26 | Stop reason: HOSPADM

## 2025-08-12 RX ORDER — FUROSEMIDE 20 MG/1
10 TABLET ORAL DAILY PRN
Status: DISCONTINUED | OUTPATIENT
Start: 2025-08-12 | End: 2025-08-26 | Stop reason: HOSPADM

## 2025-08-12 RX ORDER — METOPROLOL TARTRATE 100 MG/1
100 TABLET ORAL DAILY
Status: DISCONTINUED | OUTPATIENT
Start: 2025-08-13 | End: 2025-08-26 | Stop reason: HOSPADM

## 2025-08-12 RX ORDER — CLOPIDOGREL BISULFATE 75 MG/1
75 TABLET ORAL DAILY
Status: DISCONTINUED | OUTPATIENT
Start: 2025-08-13 | End: 2025-08-26 | Stop reason: HOSPADM

## 2025-08-12 RX ORDER — POLYETHYLENE GLYCOL 3350 17 G/17G
17 POWDER, FOR SOLUTION ORAL DAILY PRN
Status: DISCONTINUED | OUTPATIENT
Start: 2025-08-12 | End: 2025-08-26 | Stop reason: HOSPADM

## 2025-08-12 RX ORDER — BISACODYL 5 MG
10 TABLET, DELAYED RELEASE (ENTERIC COATED) ORAL DAILY PRN
Status: DISCONTINUED | OUTPATIENT
Start: 2025-08-12 | End: 2025-08-26 | Stop reason: HOSPADM

## 2025-08-12 RX ORDER — AMLODIPINE BESYLATE 10 MG/1
10 TABLET ORAL DAILY
Status: DISCONTINUED | OUTPATIENT
Start: 2025-08-13 | End: 2025-08-26 | Stop reason: HOSPADM

## 2025-08-12 RX ORDER — ASPIRIN 81 MG/1
81 TABLET ORAL DAILY
Status: DISCONTINUED | OUTPATIENT
Start: 2025-08-13 | End: 2025-08-26 | Stop reason: HOSPADM

## 2025-08-12 RX ORDER — ACETAMINOPHEN 325 MG/1
650 TABLET ORAL EVERY 4 HOURS PRN
Status: DISCONTINUED | OUTPATIENT
Start: 2025-08-12 | End: 2025-08-26 | Stop reason: HOSPADM

## 2025-08-12 RX ORDER — PANTOPRAZOLE SODIUM 40 MG/1
40 TABLET, DELAYED RELEASE ORAL
Status: DISCONTINUED | OUTPATIENT
Start: 2025-08-13 | End: 2025-08-26 | Stop reason: HOSPADM

## 2025-08-12 RX ORDER — ALUMINUM HYDROXIDE, MAGNESIUM HYDROXIDE, AND SIMETHICONE 2400; 240; 2400 MG/30ML; MG/30ML; MG/30ML
30 SUSPENSION ORAL EVERY 6 HOURS PRN
Status: DISCONTINUED | OUTPATIENT
Start: 2025-08-12 | End: 2025-08-26 | Stop reason: HOSPADM

## 2025-08-12 RX ORDER — LOSARTAN POTASSIUM 100 MG/1
100 TABLET ORAL
Status: DISCONTINUED | OUTPATIENT
Start: 2025-08-13 | End: 2025-08-26 | Stop reason: HOSPADM

## 2025-08-12 RX ORDER — ATORVASTATIN CALCIUM 40 MG/1
40 TABLET, FILM COATED ORAL NIGHTLY
Status: DISCONTINUED | OUTPATIENT
Start: 2025-08-12 | End: 2025-08-26 | Stop reason: HOSPADM

## 2025-08-12 RX ORDER — FENOFIBRATE 54 MG/1
54 TABLET ORAL DAILY
Status: DISCONTINUED | OUTPATIENT
Start: 2025-08-13 | End: 2025-08-26 | Stop reason: HOSPADM

## 2025-08-12 RX ADMIN — HEPARIN SODIUM 5000 UNITS: 5000 INJECTION INTRAVENOUS; SUBCUTANEOUS at 05:42

## 2025-08-12 RX ADMIN — POTASSIUM CHLORIDE 20 MEQ: 1500 TABLET, EXTENDED RELEASE ORAL at 09:02

## 2025-08-12 RX ADMIN — ASPIRIN 81 MG: 81 TABLET, DELAYED RELEASE ORAL at 09:02

## 2025-08-12 RX ADMIN — HEPARIN SODIUM 5000 UNITS: 5000 INJECTION INTRAVENOUS; SUBCUTANEOUS at 15:31

## 2025-08-12 RX ADMIN — CLOPIDOGREL 75 MG: 75 TABLET ORAL at 09:02

## 2025-08-12 RX ADMIN — ATORVASTATIN CALCIUM 40 MG: 40 TABLET, FILM COATED ORAL at 20:03

## 2025-08-12 RX ADMIN — FENOFIBRATE 54 MG: 54 TABLET ORAL at 09:02

## 2025-08-12 SDOH — SOCIAL STABILITY: SOCIAL NETWORK: HOW OFTEN DO YOU GET TOGETHER WITH FRIENDS OR RELATIVES?: ONCE A WEEK

## 2025-08-12 SDOH — ECONOMIC STABILITY: HOUSING INSECURITY: IN THE PAST 12 MONTHS, HOW MANY TIMES HAVE YOU MOVED WHERE YOU WERE LIVING?: 0

## 2025-08-12 SDOH — SOCIAL STABILITY: SOCIAL NETWORK: HOW OFTEN DO YOU ATTEND CHURCH OR RELIGIOUS SERVICES?: NEVER

## 2025-08-12 SDOH — SOCIAL STABILITY: SOCIAL INSECURITY: DOES ANYONE TRY TO KEEP YOU FROM HAVING/CONTACTING OTHER FRIENDS OR DOING THINGS OUTSIDE YOUR HOME?: NO

## 2025-08-12 SDOH — SOCIAL STABILITY: SOCIAL INSECURITY: HAS ANYONE EVER THREATENED TO HURT YOUR FAMILY OR YOUR PETS?: NO

## 2025-08-12 SDOH — ECONOMIC STABILITY: HOUSING INSECURITY: AT ANY TIME IN THE PAST 12 MONTHS, WERE YOU HOMELESS OR LIVING IN A SHELTER (INCLUDING NOW)?: NO

## 2025-08-12 SDOH — HEALTH STABILITY: MENTAL HEALTH: HOW MANY DRINKS CONTAINING ALCOHOL DO YOU HAVE ON A TYPICAL DAY WHEN YOU ARE DRINKING?: PATIENT DOES NOT DRINK

## 2025-08-12 SDOH — ECONOMIC STABILITY: FOOD INSECURITY: HOW HARD IS IT FOR YOU TO PAY FOR THE VERY BASICS LIKE FOOD, HOUSING, MEDICAL CARE, AND HEATING?: NOT VERY HARD

## 2025-08-12 SDOH — ECONOMIC STABILITY: FOOD INSECURITY: WITHIN THE PAST 12 MONTHS, YOU WORRIED THAT YOUR FOOD WOULD RUN OUT BEFORE YOU GOT THE MONEY TO BUY MORE.: NEVER TRUE

## 2025-08-12 SDOH — SOCIAL STABILITY: SOCIAL INSECURITY: WITHIN THE LAST YEAR, HAVE YOU BEEN HUMILIATED OR EMOTIONALLY ABUSED IN OTHER WAYS BY YOUR PARTNER OR EX-PARTNER?: NO

## 2025-08-12 SDOH — HEALTH STABILITY: MENTAL HEALTH: HOW OFTEN DO YOU HAVE SIX OR MORE DRINKS ON ONE OCCASION?: NEVER

## 2025-08-12 SDOH — ECONOMIC STABILITY: FOOD INSECURITY: WITHIN THE PAST 12 MONTHS, THE FOOD YOU BOUGHT JUST DIDN'T LAST AND YOU DIDN'T HAVE MONEY TO GET MORE.: NEVER TRUE

## 2025-08-12 SDOH — SOCIAL STABILITY: SOCIAL INSECURITY: DO YOU FEEL UNSAFE GOING BACK TO THE PLACE WHERE YOU ARE LIVING?: NO

## 2025-08-12 SDOH — SOCIAL STABILITY: SOCIAL INSECURITY: ABUSE: ADULT

## 2025-08-12 SDOH — ECONOMIC STABILITY: HOUSING INSECURITY: IN THE LAST 12 MONTHS, WAS THERE A TIME WHEN YOU WERE NOT ABLE TO PAY THE MORTGAGE OR RENT ON TIME?: NO

## 2025-08-12 SDOH — SOCIAL STABILITY: SOCIAL INSECURITY: HAVE YOU HAD ANY THOUGHTS OF HARMING ANYONE ELSE?: NO

## 2025-08-12 SDOH — SOCIAL STABILITY: SOCIAL INSECURITY: WITHIN THE LAST YEAR, HAVE YOU BEEN AFRAID OF YOUR PARTNER OR EX-PARTNER?: NO

## 2025-08-12 SDOH — SOCIAL STABILITY: SOCIAL INSECURITY: WERE YOU ABLE TO COMPLETE ALL THE BEHAVIORAL HEALTH SCREENINGS?: YES

## 2025-08-12 SDOH — HEALTH STABILITY: MENTAL HEALTH: HOW OFTEN DO YOU HAVE A DRINK CONTAINING ALCOHOL?: NEVER

## 2025-08-12 SDOH — SOCIAL STABILITY: SOCIAL NETWORK: HOW OFTEN DO YOU ATTEND MEETINGS OF THE CLUBS OR ORGANIZATIONS YOU BELONG TO?: NEVER

## 2025-08-12 SDOH — ECONOMIC STABILITY: TRANSPORTATION INSECURITY
IN THE PAST 12 MONTHS, HAS LACK OF TRANSPORTATION KEPT YOU FROM MEETINGS, WORK, OR FROM GETTING THINGS NEEDED FOR DAILY LIVING?: NO

## 2025-08-12 SDOH — SOCIAL STABILITY: SOCIAL INSECURITY: ARE YOU MARRIED, WIDOWED, DIVORCED, SEPARATED, NEVER MARRIED, OR LIVING WITH A PARTNER?: WIDOWED

## 2025-08-12 SDOH — HEALTH STABILITY: PHYSICAL HEALTH: ON AVERAGE, HOW MANY MINUTES DO YOU ENGAGE IN EXERCISE AT THIS LEVEL?: 0 MIN

## 2025-08-12 SDOH — HEALTH STABILITY: PHYSICAL HEALTH: ON AVERAGE, HOW MANY DAYS PER WEEK DO YOU ENGAGE IN MODERATE TO STRENUOUS EXERCISE (LIKE A BRISK WALK)?: 0 DAYS

## 2025-08-12 SDOH — ECONOMIC STABILITY: INCOME INSECURITY: IN THE PAST 12 MONTHS HAS THE ELECTRIC, GAS, OIL, OR WATER COMPANY THREATENED TO SHUT OFF SERVICES IN YOUR HOME?: NO

## 2025-08-12 SDOH — SOCIAL STABILITY: SOCIAL INSECURITY: HAVE YOU HAD THOUGHTS OF HARMING ANYONE ELSE?: NO

## 2025-08-12 SDOH — SOCIAL STABILITY: SOCIAL INSECURITY: DO YOU FEEL ANYONE HAS EXPLOITED OR TAKEN ADVANTAGE OF YOU FINANCIALLY OR OF YOUR PERSONAL PROPERTY?: NO

## 2025-08-12 SDOH — HEALTH STABILITY: MENTAL HEALTH: EXPERIENCED ANY OF THE FOLLOWING LIFE EVENTS: DEATH OF FAMILY/FRIEND

## 2025-08-12 SDOH — SOCIAL STABILITY: SOCIAL INSECURITY: ARE THERE ANY APPARENT SIGNS OF INJURIES/BEHAVIORS THAT COULD BE RELATED TO ABUSE/NEGLECT?: NO

## 2025-08-12 SDOH — SOCIAL STABILITY: SOCIAL INSECURITY: ARE YOU OR HAVE YOU BEEN THREATENED OR ABUSED PHYSICALLY, EMOTIONALLY, OR SEXUALLY BY ANYONE?: NO

## 2025-08-12 SDOH — ECONOMIC STABILITY: TRANSPORTATION INSECURITY
IN THE PAST 12 MONTHS, HAS THE LACK OF TRANSPORTATION KEPT YOU FROM MEDICAL APPOINTMENTS OR FROM GETTING MEDICATIONS?: NO

## 2025-08-12 ASSESSMENT — LIFESTYLE VARIABLES
SKIP TO QUESTIONS 9-10: 1
PRESCIPTION_ABUSE_PAST_12_MONTHS: NO
SUBSTANCE_ABUSE_PAST_12_MONTHS: NO
AUDIT-C TOTAL SCORE: 0

## 2025-08-12 ASSESSMENT — BRIEF INTERVIEW FOR MENTAL STATUS (BIMS)
WHAT MONTH IS IT: ACCURATE WITHIN 5 DAYS
COGNITIVE PATTERN ASSESSMENT USED: BIMS
ASKED TO RECALL BED: YES, NO CUE REQUIRED
INITIAL REPETITION OF BED BLUE SOCK - FIRST ATTEMPT: 3
ASKED TO RECALL BLUE: YES, NO CUE REQUIRED
WHAT DAY OF THE WEEK IS IT: CORRECT
WHAT YEAR IS IT: CORRECT
ASKED TO RECALL SOCK: YES, NO CUE REQUIRED
GENERAL FUNCTIONAL COGNITION RATING: INDEPENDENT
BIMS SUMMARY SCORE: 15

## 2025-08-12 ASSESSMENT — PAIN - FUNCTIONAL ASSESSMENT
PAIN_FUNCTIONAL_ASSESSMENT: 0-10

## 2025-08-12 ASSESSMENT — COGNITIVE AND FUNCTIONAL STATUS - GENERAL
MOVING FROM LYING ON BACK TO SITTING ON SIDE OF FLAT BED WITH BEDRAILS: A LITTLE
DRESSING REGULAR LOWER BODY CLOTHING: A LITTLE
MOBILITY SCORE: 18
WALKING IN HOSPITAL ROOM: A LITTLE
DRESSING REGULAR UPPER BODY CLOTHING: A LITTLE
HELP NEEDED FOR BATHING: A LITTLE
TURNING FROM BACK TO SIDE WHILE IN FLAT BAD: A LITTLE
DAILY ACTIVITIY SCORE: 21
MOVING TO AND FROM BED TO CHAIR: A LITTLE
STANDING UP FROM CHAIR USING ARMS: A LITTLE
CLIMB 3 TO 5 STEPS WITH RAILING: A LITTLE

## 2025-08-12 ASSESSMENT — PAIN SCALES - GENERAL
PAINLEVEL_OUTOF10: 0 - NO PAIN

## 2025-08-12 ASSESSMENT — ACTIVITIES OF DAILY LIVING (ADL): ASSISTIVE_DEVICE: EYEGLASSES

## 2025-08-12 ASSESSMENT — PATIENT HEALTH QUESTIONNAIRE - PHQ9
1. LITTLE INTEREST OR PLEASURE IN DOING THINGS: NOT AT ALL
2. FEELING DOWN, DEPRESSED OR HOPELESS: NOT AT ALL
SUM OF ALL RESPONSES TO PHQ9 QUESTIONS 1 & 2: 0

## 2025-08-12 NOTE — PROGRESS NOTES
08/12/25 1000   Discharge Planning   Expected Discharge Disposition Rehab  (Auth received (good 8/11-8/24) for transition to [facility]. Attending physicians aware of auth. At time of this note still awaiting dc confirmation and dc paperwork)        08/12/25 1300   Discharge Planning   Expected Discharge Disposition Rehab  (Dc confirmed for today to [facility]. Spoke with patient and patient's daughter in law and they are aware of dc. AVS and GOLDFORM sent to [facility] and they are aware of 5pm stretcher via [facility])

## 2025-08-12 NOTE — DISCHARGE SUMMARY
Discharge Diagnosis  Acute/Subacute CVA          Issues Requiring Follow-Up  Follow up PCP 1-2 weeks  Follow up Neurology 2-4 weeks   Establish care with Cardiology (referral placed)    Discharge Meds     Medication List      START taking these medications     acetaminophen 325 mg tablet; Commonly known as: Tylenol; Take 2 tablets   (650 mg) by mouth every 4 hours if needed for moderate pain (4 - 6) for up   to 5 days.   aspirin 81 mg EC tablet; Take 1 tablet (81 mg) by mouth once daily.   clopidogrel 75 mg tablet; Commonly known as: Plavix; Take 1 tablet (75   mg) by mouth once daily for 21 days.   polyethylene glycol 17 gram packet; Commonly known as: Glycolax,   Miralax; Take 17 g by mouth once daily as needed (Constipation).     CHANGE how you take these medications     fenofibrate 54 mg tablet; Commonly known as: Tricor; Take 1 tablet (54   mg) by mouth once daily.; Start taking on: August 13, 2025; What changed:   how much to take, when to take this   furosemide 20 mg tablet; Commonly known as: Lasix; Take 0.5 tablets (10   mg) by mouth 3 (three) times a week. As needed for leg edema; What   changed: when to take this, additional instructions     CONTINUE taking these medications     amLODIPine 10 mg tablet; Commonly known as: Norvasc   atorvastatin 40 mg tablet; Commonly known as: Lipitor   buPROPion  mg 12 hr tablet; Commonly known as: Wellbutrin SR   losartan 100 mg tablet; Commonly known as: Cozaar   metoprolol tartrate 100 mg tablet; Commonly known as: Lopressor   sertraline 100 mg tablet; Commonly known as: Zoloft     STOP taking these medications     allopurinol 100 mg tablet; Commonly known as: Zyloprim   APIXABAN ORAL   ciprofloxacin 250 mg tablet; Commonly known as: Cipro   estrogens (conjugated) vaginal cream; Commonly known as: Premarin   famotidine 20 mg tablet; Commonly known as: Pepcid   Jardiance 10 mg tablet; Generic drug: empagliflozin   Macrobid 100 mg capsule; Generic drug:  nitrofurantoin   (macrocrystal-monohydrate)   metFORMIN  mg 24 hr tablet; Commonly known as: Glucophage-XR   omeprazole 20 mg DR capsule; Commonly known as: PriLOSEC   traMADol 50 mg tablet; Commonly known as: Ultram       Test Results Pending At Discharge  Pending Labs       Order Current Status    Extra Urine Gray Tube In process    Urinalysis with Reflex Culture and Microscopic In process            Hospital Course    80 year old female admitted 8/8/25 after being found down on ground family. On admit presenting with right-sided weakness, evaluated for acute CVA, MRI revealing small subacute infarct in the left posterior basal ganglia. S/p evaluation by PT OT SLP, recommending high intensity rehabilitation needs on discharge. [facility] has been accepted to [facility] for continued [facility]. Patient to get Holter monitor on discharge.    Patient to follow up PCP 1-2 weeks. Neurology 2-4 weeks. Establish care with Cardiologist within 4 weeks.     Pertinent Physical Exam At Time of Discharge  Physical Exam  Vitals and nursing note reviewed.   Constitutional:       Appearance: Normal appearance.   HENT:      Head: Normocephalic and atraumatic.      Right Ear: External ear normal.      Left Ear: External ear normal.      Nose: Nose normal.      Mouth/Throat:      Mouth: Mucous membranes are moist.     Eyes:      Extraocular Movements: Extraocular movements intact.      Conjunctiva/sclera: Conjunctivae normal.       Cardiovascular:      Rate and Rhythm: Normal rate and regular rhythm.      Pulses: Normal pulses.      Heart sounds: Normal heart sounds.   Pulmonary:      Effort: Pulmonary effort is normal.      Breath sounds: Normal breath sounds.   Abdominal:      Palpations: Abdomen is soft.     Musculoskeletal:         General: Normal range of motion.      Cervical back: Normal range of motion and neck supple.     Skin:     General: Skin is warm and dry.      Capillary Refill: Capillary refill takes less than 2  seconds.     Neurological:      Mental Status: She is alert and oriented to person, place, and time.     Psychiatric:         Mood and Affect: Mood normal.         Behavior: Behavior normal.         Time spent on discharge 35 minutes.   Outpatient Follow-Up  No future appointments.      Zuleima Dotson, KACY-CNP

## 2025-08-12 NOTE — PROGRESS NOTES
"Speech-Language Pathology    SLP Adult Inpatient  Speech-Language Pathology Treatment     Patient Name: Karey Oakley \"marilyn\"  MRN: [id number]  Today's Date: 8/12/2025      Current Problem:   1. Acute CVA (cerebrovascular accident) (Multi)  Transthoracic Echo Complete    Transthoracic Echo Complete    acetaminophen (Tylenol) 325 mg tablet    aspirin 81 mg EC tablet    clopidogrel (Plavix) 75 mg tablet    polyethylene glycol (Glycolax, Miralax) 17 gram packet      2. Acute kidney injury        3. Syncope and collapse  Transthoracic Echo Complete    Transthoracic Echo Complete      4. Chronic heart failure with preserved ejection fraction  furosemide (Lasix) 20 mg tablet          SLP Assessment:  Patient seen for speech-language cognitive treatment at the bedside. She is cooperative with all tasks. Presents with significant improvement in speech and language skills. Presentswith verbal expression WFL- continued cog impairment for STM and attention.   Plan:  Recommend continue further, more in depth cognitive testing at rehab facility.   Recommend continue cognitive treatment in next setting.    Subjective   Alert, cooperative,talkative- notes improvement \" I wasn't able to talk and now all of my speech has come back.\"  Reports patient is discharging this date to rehab facility.   Objective:    Goals:  Pt will demonstrate orientation to all concepts with use of visual aids and environmental cues with 90% accuracy given occ cues. DISCONTINUE ( Goal met 8/12/25)  Pt will demonstrate responsive naming skills during formal tx activities with 90% accuracy given min cues. DISCONTINUE: Goal met 8/12/25  Pt will demonstrate generative naming skills during formal tx activities with 80% accuracy given min cues. DISCONTINUE: Goal met 8/12/25  Pt will demonstrate word finding in informal conversation with 80% accuracy given min cues. DISCONTINUE: Goal met 8/12/25  Pt will demonstrate short term recall skills with use of " compensatory strategies for recall with 80% accuracy given min cues. CONTINUE: 8/12/25: recall of 1/3 items presented verbally  ADDITIONAL GOALS PENDING FURTHER COG TESTING       Recommendations:   Skilled ST services for cognitive-linguistic reeducation in order to facilitate pt safety, pt independence, pt functional communication skills, and pt return to PLOF.     Inpatient Education:   Educated on POC and progress- educated on recommendation for further higher level cognitive testing. Verbal understanding given.

## 2025-08-12 NOTE — CARE PLAN
The patient's goals for the shift include Unable    The clinical goals for the shift include Pt will remain free from falls during this shift    Over the shift, the patient did not make progress toward the following goals. Barriers to progression include the patient. Recommendations to address these barriers include education.

## 2025-08-12 NOTE — PROGRESS NOTES
Spiritual Care Visit  Spiritual Care Request    Reason for Visit:  Routine Visit: Introduction     Request Received From:       Focus of Care:  Visited With: Patient         Refer to :          Spiritual Care Assessment    Spiritual Assessment:                      Care Provided:  Intended Effects: Build relationship of care and support, Convey a calming presence, Demonstrate caring and concern    Sense of Community and or Muslim Affiliation:  Episcopalian   Values/Beliefs  Spiritual Requests During Hospitalization: Rodri asked to be anointed and to have Communion,     Addressed Needs/Concerns and/or Carlos Through:     Sacramental Encounters  Communion Given Indicator: Yes  Sacrament of Sick-Anointing: Anointed    Outcome:        Advance Directives:         Spiritual Care Annotation    Annotation:  Rodri asked to be anointed and to have Communion.  Kee Rodriguez

## 2025-08-12 NOTE — CARE PLAN
The patient's goals for the shift include Getting some rest    The clinical goals for the shift include remain fall free during shifts      Problem: General Stroke  Goal: Establish a mutual long term goal with patient by discharge  Outcome: Progressing  Goal: Demonstrate improvement in neurological exam throughout the shift  Outcome: Progressing  Goal: Maintain BP within ordered limits throughout shift  Outcome: Progressing  Goal: Participate in treatment (ie., meds, therapy) throughout shift  Outcome: Progressing  Goal: No symptoms of aspiration throughout shift  Outcome: Progressing  Goal: No symptoms of hemorrhage throughout shift  Outcome: Progressing  Goal: Tolerate enteral feeding throughout shift  Outcome: Progressing  Goal: Decreased nausea/vomiting throughout shift  Outcome: Progressing  Goal: Controlled blood glucose throughout shift  Outcome: Progressing  Goal: Out of bed three times today  Outcome: Progressing     Problem: ICU Stroke  Goal: Maintain ICP within ordered limits throughout shift  Outcome: Progressing  Goal: Tolerate EVD clamping trial throughout shift  Outcome: Progressing  Goal: Tolerate ventilator weaning trial during shift  Outcome: Progressing  Goal: Maintain patent airway throughout shift  Outcome: Progressing  Goal: Achieve/maintain targeted sodium level throughout shift  Outcome: Progressing     Problem: Heart Failure  Goal: Improved gas exchange this shift  Outcome: Progressing  Goal: Improved urinary output this shift  Outcome: Progressing  Goal: Reduction in peripheral edema within 24 hours  Outcome: Progressing  Goal: Report improvement of dyspnea/breathlessness this shift  Outcome: Progressing  Goal: Weight from fluid excess reduced over 2-3 days, then stabilize  Outcome: Progressing  Goal: Increase self care and/or family involvement in 24 hours  Outcome: Progressing     Problem: Pain - Adult  Goal: Verbalizes/displays adequate comfort level or baseline comfort level  Outcome:  Progressing     Problem: Safety - Adult  Goal: Free from fall injury  Outcome: Progressing     Problem: Discharge Planning  Goal: Discharge to home or other facility with appropriate resources  Outcome: Progressing     Problem: Chronic Conditions and Co-morbidities  Goal: Patient's chronic conditions and co-morbidity symptoms are monitored and maintained or improved  Outcome: Progressing     Problem: Nutrition  Goal: Nutrient intake appropriate for maintaining nutritional needs  Outcome: Progressing     Problem: Skin  Goal: Decreased wound size/increased tissue granulation at next dressing change  Outcome: Progressing  Goal: Participates in plan/prevention/treatment measures  Outcome: Progressing  Goal: Prevent/manage excess moisture  Outcome: Progressing  Goal: Prevent/minimize sheer/friction injuries  Outcome: Progressing  Goal: Promote/optimize nutrition  Outcome: Progressing  Goal: Promote skin healing  Outcome: Progressing

## 2025-08-13 LAB
ANION GAP SERPL CALCULATED.3IONS-SCNC: 15 MMOL/L (ref 10–20)
BUN SERPL-MCNC: 18 MG/DL (ref 6–23)
CALCIUM SERPL-MCNC: 10.3 MG/DL (ref 8.6–10.3)
CHLORIDE SERPL-SCNC: 106 MMOL/L (ref 98–107)
CO2 SERPL-SCNC: 22 MMOL/L (ref 21–32)
CREAT SERPL-MCNC: 1 MG/DL (ref 0.5–1.05)
EGFRCR SERPLBLD CKD-EPI 2021: 57 ML/MIN/1.73M*2
ERYTHROCYTE [DISTWIDTH] IN BLOOD BY AUTOMATED COUNT: 14.8 % (ref 11.5–14.5)
GLUCOSE SERPL-MCNC: 127 MG/DL (ref 74–99)
HCT VFR BLD AUTO: 39.6 % (ref 36–46)
HGB BLD-MCNC: 12.6 G/DL (ref 12–16)
MCH RBC QN AUTO: 28.3 PG (ref 26–34)
MCHC RBC AUTO-ENTMCNC: 31.8 G/DL (ref 32–36)
MCV RBC AUTO: 89 FL (ref 80–100)
NRBC BLD-RTO: 0.2 /100 WBCS (ref 0–0)
PLATELET # BLD AUTO: 400 X10*3/UL (ref 150–450)
POTASSIUM SERPL-SCNC: 4.1 MMOL/L (ref 3.5–5.3)
RBC # BLD AUTO: 4.46 X10*6/UL (ref 4–5.2)
SODIUM SERPL-SCNC: 139 MMOL/L (ref 136–145)
WBC # BLD AUTO: 10.2 X10*3/UL (ref 4.4–11.3)

## 2025-08-13 PROCEDURE — 2500000001 HC RX 250 WO HCPCS SELF ADMINISTERED DRUGS (ALT 637 FOR MEDICARE OP): Performed by: INTERNAL MEDICINE

## 2025-08-13 PROCEDURE — 2500000002 HC RX 250 W HCPCS SELF ADMINISTERED DRUGS (ALT 637 FOR MEDICARE OP, ALT 636 FOR OP/ED): Performed by: INTERNAL MEDICINE

## 2025-08-13 PROCEDURE — 85027 COMPLETE CBC AUTOMATED: CPT | Performed by: INTERNAL MEDICINE

## 2025-08-13 PROCEDURE — 97530 THERAPEUTIC ACTIVITIES: CPT | Mod: GO

## 2025-08-13 PROCEDURE — 1180000001 HC REHAB PRIVATE ROOM DAILY

## 2025-08-13 PROCEDURE — 99223 1ST HOSP IP/OBS HIGH 75: CPT | Performed by: INTERNAL MEDICINE

## 2025-08-13 PROCEDURE — 80048 BASIC METABOLIC PNL TOTAL CA: CPT | Performed by: INTERNAL MEDICINE

## 2025-08-13 PROCEDURE — 92523 SPEECH SOUND LANG COMPREHEN: CPT | Mod: GN

## 2025-08-13 PROCEDURE — 97166 OT EVAL MOD COMPLEX 45 MIN: CPT | Mod: GO

## 2025-08-13 PROCEDURE — 97116 GAIT TRAINING THERAPY: CPT | Mod: GP

## 2025-08-13 PROCEDURE — 97535 SELF CARE MNGMENT TRAINING: CPT | Mod: GO

## 2025-08-13 PROCEDURE — 36415 COLL VENOUS BLD VENIPUNCTURE: CPT | Performed by: INTERNAL MEDICINE

## 2025-08-13 PROCEDURE — 97161 PT EVAL LOW COMPLEX 20 MIN: CPT | Mod: GP

## 2025-08-13 PROCEDURE — 97530 THERAPEUTIC ACTIVITIES: CPT | Mod: GP

## 2025-08-13 PROCEDURE — 97112 NEUROMUSCULAR REEDUCATION: CPT | Mod: GP

## 2025-08-13 PROCEDURE — 97110 THERAPEUTIC EXERCISES: CPT | Mod: GP

## 2025-08-13 RX ADMIN — METOPROLOL TARTRATE 100 MG: 100 TABLET, FILM COATED ORAL at 08:18

## 2025-08-13 RX ADMIN — CLOPIDOGREL 75 MG: 75 TABLET ORAL at 08:18

## 2025-08-13 RX ADMIN — LOSARTAN POTASSIUM 100 MG: 100 TABLET, FILM COATED ORAL at 06:14

## 2025-08-13 RX ADMIN — ACETAMINOPHEN 650 MG: 325 TABLET ORAL at 16:05

## 2025-08-13 RX ADMIN — ASPIRIN 81 MG: 81 TABLET, COATED ORAL at 08:18

## 2025-08-13 RX ADMIN — ATORVASTATIN CALCIUM 40 MG: 40 TABLET, FILM COATED ORAL at 20:49

## 2025-08-13 RX ADMIN — BUPROPION HYDROCHLORIDE 150 MG: 150 TABLET, FILM COATED, EXTENDED RELEASE ORAL at 06:14

## 2025-08-13 RX ADMIN — SERTRALINE HYDROCHLORIDE 150 MG: 50 TABLET ORAL at 06:13

## 2025-08-13 RX ADMIN — FENOFIBRATE 54 MG: 54 TABLET ORAL at 08:18

## 2025-08-13 RX ADMIN — PANTOPRAZOLE SODIUM 40 MG: 40 TABLET, DELAYED RELEASE ORAL at 06:14

## 2025-08-13 RX ADMIN — AMLODIPINE BESYLATE 10 MG: 10 TABLET ORAL at 08:18

## 2025-08-13 ASSESSMENT — PAIN SCALES - GENERAL
PAINLEVEL_OUTOF10: 3
PAINLEVEL_OUTOF10: 0 - NO PAIN

## 2025-08-13 ASSESSMENT — COGNITIVE AND FUNCTIONAL STATUS - GENERAL
DRESSING REGULAR LOWER BODY CLOTHING: A LITTLE
DRESSING REGULAR UPPER BODY CLOTHING: A LITTLE
HELP NEEDED FOR BATHING: A LITTLE
TOILETING: A LITTLE
DAILY ACTIVITIY SCORE: 20

## 2025-08-13 ASSESSMENT — BALANCE ASSESSMENTS
STANDING ON ONE LEG: UNABLE TO TRY NEEDS ASSIST TO PREVENT FALL
PICK UP OBJECT FROM THE FLOOR FROM A STANDING POSITION: ABLE TO PICK UP SLIPPER BUT NEEDS SUPERVISION
TURN 360 DEGREES: NEEDS CLOSE SUPERVISION OR VERBAL CUEING
PLACE ALTERNATE FOOT ON STEP OR STOOL WHILE STANDING UNSUPPORTED: NEEDS ASSISTANCE TO KEEP FROM FALLING/UNABLE TO TRY
STANDING UNSUPPORTED WITH EYES CLOSED: ABLE TO STAND 10 SECONDS WITH SUPERVISION
STANDING UNSUPPORTED: ABLE TO STAND 2 MINUTES WITH SUPERVISION
LONG VERSION TOTAL SCORE (MAX 56): 28
REACHING FORWARD WITH OUTSTRETCHED ARM WHILE STANDING: CAN REACH FORWARD 5 CM (2 INCHES)
STANDING TO SITTING: ABLE TO STAND INDEPENDENTLY USING HANDS
STANDING UNSUPPORTED ONE FOOT IN FRONT: LOSES BALANCE WHILE STEPPING OR STANDING
SITTING WITH BACK UNSUPPORTED BUT FEET SUPPORTED ON FLOOR OR ON A STOOL: ABLE TO SIT SAFELY AND SECURELY FOR 2 MINUTES
STANDING TO SITTING: USES BACK OF LEGS AGAINST CHAIR TO CONTROL DESCENT
TRANSFERS: ABLE TO TRANSFER WITH VERBAL CUEING AND/OR SUPERVISION
STANDING UNSUPPORTED WITH FEET TOGETHER: ABLE TO PLACE FEET TOGETHER INDEPENDENTLY AND STAND 1 MINUTE WITH SUPERVISION
PLACE ALTERNATE FOOT ON STEP OR STOOL WHILE STANDING UNSUPPORTED: TURNS SIDEWAYS ONLY BUT MAINTAINS BALANCE

## 2025-08-13 ASSESSMENT — ENCOUNTER SYMPTOMS
HEADACHES: 0
VOMITING: 0
NAUSEA: 0
DIARRHEA: 0
CHILLS: 0
FEVER: 0
SHORTNESS OF BREATH: 0
COUGH: 0
APPETITE CHANGE: 0
ABDOMINAL PAIN: 0

## 2025-08-13 ASSESSMENT — PAIN - FUNCTIONAL ASSESSMENT
PAIN_FUNCTIONAL_ASSESSMENT: 0-10

## 2025-08-13 ASSESSMENT — ACTIVITIES OF DAILY LIVING (ADL)
HOME_MANAGEMENT_TIME_ENTRY: 30
ADL_ASSISTANCE: INDEPENDENT
ADL_ASSISTANCE: INDEPENDENT
BATHING_ASSISTANCE: MINIMAL

## 2025-08-13 ASSESSMENT — PAIN DESCRIPTION - DESCRIPTORS
DESCRIPTORS: HEADACHE
DESCRIPTORS: HEADACHE

## 2025-08-13 ASSESSMENT — PAIN DESCRIPTION - LOCATION: LOCATION: HEAD

## 2025-08-14 PROCEDURE — 97110 THERAPEUTIC EXERCISES: CPT | Mod: GP

## 2025-08-14 PROCEDURE — 99232 SBSQ HOSP IP/OBS MODERATE 35: CPT | Performed by: INTERNAL MEDICINE

## 2025-08-14 PROCEDURE — 92507 TX SP LANG VOICE COMM INDIV: CPT | Mod: GN

## 2025-08-14 PROCEDURE — 97116 GAIT TRAINING THERAPY: CPT | Mod: GP,CQ

## 2025-08-14 PROCEDURE — 1180000001 HC REHAB PRIVATE ROOM DAILY

## 2025-08-14 PROCEDURE — 97112 NEUROMUSCULAR REEDUCATION: CPT | Mod: GP,CQ

## 2025-08-14 PROCEDURE — 2500000001 HC RX 250 WO HCPCS SELF ADMINISTERED DRUGS (ALT 637 FOR MEDICARE OP): Performed by: INTERNAL MEDICINE

## 2025-08-14 PROCEDURE — 97530 THERAPEUTIC ACTIVITIES: CPT | Mod: GO

## 2025-08-14 PROCEDURE — 97530 THERAPEUTIC ACTIVITIES: CPT | Mod: GP

## 2025-08-14 PROCEDURE — 2500000002 HC RX 250 W HCPCS SELF ADMINISTERED DRUGS (ALT 637 FOR MEDICARE OP, ALT 636 FOR OP/ED): Performed by: INTERNAL MEDICINE

## 2025-08-14 PROCEDURE — 97535 SELF CARE MNGMENT TRAINING: CPT | Mod: GO

## 2025-08-14 RX ADMIN — ATORVASTATIN CALCIUM 40 MG: 40 TABLET, FILM COATED ORAL at 20:50

## 2025-08-14 RX ADMIN — FENOFIBRATE 54 MG: 54 TABLET ORAL at 09:15

## 2025-08-14 RX ADMIN — SERTRALINE HYDROCHLORIDE 150 MG: 50 TABLET ORAL at 06:23

## 2025-08-14 RX ADMIN — CLOPIDOGREL 75 MG: 75 TABLET ORAL at 09:14

## 2025-08-14 RX ADMIN — LOSARTAN POTASSIUM 100 MG: 100 TABLET, FILM COATED ORAL at 06:23

## 2025-08-14 RX ADMIN — METOPROLOL TARTRATE 100 MG: 100 TABLET, FILM COATED ORAL at 09:14

## 2025-08-14 RX ADMIN — PANTOPRAZOLE SODIUM 40 MG: 40 TABLET, DELAYED RELEASE ORAL at 06:23

## 2025-08-14 RX ADMIN — BUPROPION HYDROCHLORIDE 150 MG: 150 TABLET, FILM COATED, EXTENDED RELEASE ORAL at 06:24

## 2025-08-14 RX ADMIN — ASPIRIN 81 MG: 81 TABLET, COATED ORAL at 09:14

## 2025-08-14 RX ADMIN — AMLODIPINE BESYLATE 10 MG: 10 TABLET ORAL at 09:14

## 2025-08-14 ASSESSMENT — PAIN SCALES - GENERAL
PAINLEVEL_OUTOF10: 0 - NO PAIN

## 2025-08-14 ASSESSMENT — ACTIVITIES OF DAILY LIVING (ADL)
HOME_MANAGEMENT_TIME_ENTRY: 45
BATHING_LEVEL_OF_ASSISTANCE: CONTACT GUARD
BATHING_COMMENTS: SEATED ON SHOWER CHAIR AND IN STANCE AT GRAB BAR
BATHING_WHERE_ASSESSED: SHOWER
BATHING_EQUIPMENT_NEEDED: REMOVEABLE SHOWER HEAD;LONG-HANDLED SPONGE

## 2025-08-14 ASSESSMENT — PAIN - FUNCTIONAL ASSESSMENT
PAIN_FUNCTIONAL_ASSESSMENT: 0-10

## 2025-08-15 PROCEDURE — 97530 THERAPEUTIC ACTIVITIES: CPT | Mod: GO,CO

## 2025-08-15 PROCEDURE — 97112 NEUROMUSCULAR REEDUCATION: CPT | Mod: GP,CQ

## 2025-08-15 PROCEDURE — 1180000001 HC REHAB PRIVATE ROOM DAILY

## 2025-08-15 PROCEDURE — 97530 THERAPEUTIC ACTIVITIES: CPT | Mod: GP,CQ

## 2025-08-15 PROCEDURE — 97116 GAIT TRAINING THERAPY: CPT | Mod: GP

## 2025-08-15 PROCEDURE — 2500000002 HC RX 250 W HCPCS SELF ADMINISTERED DRUGS (ALT 637 FOR MEDICARE OP, ALT 636 FOR OP/ED): Performed by: INTERNAL MEDICINE

## 2025-08-15 PROCEDURE — 97110 THERAPEUTIC EXERCISES: CPT | Mod: GP,CQ

## 2025-08-15 PROCEDURE — 97110 THERAPEUTIC EXERCISES: CPT | Mod: GO,CO

## 2025-08-15 PROCEDURE — 2500000001 HC RX 250 WO HCPCS SELF ADMINISTERED DRUGS (ALT 637 FOR MEDICARE OP): Performed by: INTERNAL MEDICINE

## 2025-08-15 PROCEDURE — 97535 SELF CARE MNGMENT TRAINING: CPT | Mod: GO,CO

## 2025-08-15 RX ADMIN — BUPROPION HYDROCHLORIDE 150 MG: 150 TABLET, FILM COATED, EXTENDED RELEASE ORAL at 06:27

## 2025-08-15 RX ADMIN — SERTRALINE HYDROCHLORIDE 150 MG: 50 TABLET ORAL at 06:27

## 2025-08-15 RX ADMIN — PANTOPRAZOLE SODIUM 40 MG: 40 TABLET, DELAYED RELEASE ORAL at 06:27

## 2025-08-15 RX ADMIN — ACETAMINOPHEN 650 MG: 325 TABLET ORAL at 14:10

## 2025-08-15 RX ADMIN — ATORVASTATIN CALCIUM 40 MG: 40 TABLET, FILM COATED ORAL at 21:01

## 2025-08-15 RX ADMIN — LOSARTAN POTASSIUM 100 MG: 100 TABLET, FILM COATED ORAL at 06:27

## 2025-08-15 RX ADMIN — METOPROLOL TARTRATE 100 MG: 100 TABLET, FILM COATED ORAL at 09:22

## 2025-08-15 RX ADMIN — ASPIRIN 81 MG: 81 TABLET, COATED ORAL at 09:19

## 2025-08-15 RX ADMIN — AMLODIPINE BESYLATE 10 MG: 10 TABLET ORAL at 09:22

## 2025-08-15 RX ADMIN — FENOFIBRATE 54 MG: 54 TABLET ORAL at 09:19

## 2025-08-15 RX ADMIN — CLOPIDOGREL 75 MG: 75 TABLET ORAL at 09:19

## 2025-08-15 ASSESSMENT — PAIN SCALES - GENERAL
PAINLEVEL_OUTOF10: 0 - NO PAIN
PAINLEVEL_OUTOF10: 3

## 2025-08-15 ASSESSMENT — PAIN - FUNCTIONAL ASSESSMENT
PAIN_FUNCTIONAL_ASSESSMENT: 0-10

## 2025-08-15 ASSESSMENT — PAIN DESCRIPTION - DESCRIPTORS: DESCRIPTORS: HEADACHE

## 2025-08-15 ASSESSMENT — ACTIVITIES OF DAILY LIVING (ADL)
HOME_MANAGEMENT_TIME_ENTRY: 45
EFFECT OF PAIN ON DAILY ACTIVITIES: REST

## 2025-08-16 PROCEDURE — 97112 NEUROMUSCULAR REEDUCATION: CPT | Mod: GP

## 2025-08-16 PROCEDURE — 2500000002 HC RX 250 W HCPCS SELF ADMINISTERED DRUGS (ALT 637 FOR MEDICARE OP, ALT 636 FOR OP/ED): Performed by: INTERNAL MEDICINE

## 2025-08-16 PROCEDURE — 97530 THERAPEUTIC ACTIVITIES: CPT | Mod: GO,CO

## 2025-08-16 PROCEDURE — 97530 THERAPEUTIC ACTIVITIES: CPT | Mod: GP,CQ

## 2025-08-16 PROCEDURE — 97535 SELF CARE MNGMENT TRAINING: CPT | Mod: GO,CO

## 2025-08-16 PROCEDURE — 2500000001 HC RX 250 WO HCPCS SELF ADMINISTERED DRUGS (ALT 637 FOR MEDICARE OP): Performed by: INTERNAL MEDICINE

## 2025-08-16 PROCEDURE — 97116 GAIT TRAINING THERAPY: CPT | Mod: GP,CQ

## 2025-08-16 PROCEDURE — 1180000001 HC REHAB PRIVATE ROOM DAILY

## 2025-08-16 RX ADMIN — BUPROPION HYDROCHLORIDE 150 MG: 150 TABLET, FILM COATED, EXTENDED RELEASE ORAL at 06:09

## 2025-08-16 RX ADMIN — FENOFIBRATE 54 MG: 54 TABLET ORAL at 08:32

## 2025-08-16 RX ADMIN — CLOPIDOGREL 75 MG: 75 TABLET ORAL at 08:32

## 2025-08-16 RX ADMIN — METOPROLOL TARTRATE 100 MG: 100 TABLET, FILM COATED ORAL at 08:32

## 2025-08-16 RX ADMIN — PANTOPRAZOLE SODIUM 40 MG: 40 TABLET, DELAYED RELEASE ORAL at 06:10

## 2025-08-16 RX ADMIN — LOSARTAN POTASSIUM 100 MG: 100 TABLET, FILM COATED ORAL at 06:09

## 2025-08-16 RX ADMIN — AMLODIPINE BESYLATE 10 MG: 10 TABLET ORAL at 08:32

## 2025-08-16 RX ADMIN — ACETAMINOPHEN 650 MG: 325 TABLET ORAL at 16:40

## 2025-08-16 RX ADMIN — SERTRALINE HYDROCHLORIDE 150 MG: 50 TABLET ORAL at 06:09

## 2025-08-16 RX ADMIN — ATORVASTATIN CALCIUM 40 MG: 40 TABLET, FILM COATED ORAL at 20:58

## 2025-08-16 RX ADMIN — ASPIRIN 81 MG: 81 TABLET, COATED ORAL at 08:32

## 2025-08-16 ASSESSMENT — PAIN SCALES - GENERAL
PAINLEVEL_OUTOF10: 0 - NO PAIN
PAINLEVEL_OUTOF10: 3
PAINLEVEL_OUTOF10: 0 - NO PAIN
PAINLEVEL_OUTOF10: 0 - NO PAIN

## 2025-08-16 ASSESSMENT — PAIN - FUNCTIONAL ASSESSMENT
PAIN_FUNCTIONAL_ASSESSMENT: 0-10

## 2025-08-16 ASSESSMENT — ACTIVITIES OF DAILY LIVING (ADL)
BATHING_WHERE_ASSESSED: SHOWER
HOME_MANAGEMENT_TIME_ENTRY: 45
EFFECT OF PAIN ON DAILY ACTIVITIES: REST
BATHING_LEVEL_OF_ASSISTANCE: SETUP

## 2025-08-16 ASSESSMENT — PAIN DESCRIPTION - DESCRIPTORS
DESCRIPTORS: HEADACHE
DESCRIPTORS: HEADACHE

## 2025-08-17 VITALS
HEART RATE: 62 BPM | OXYGEN SATURATION: 99 % | WEIGHT: 157.63 LBS | BODY MASS INDEX: 29.01 KG/M2 | TEMPERATURE: 97 F | DIASTOLIC BLOOD PRESSURE: 65 MMHG | RESPIRATION RATE: 16 BRPM | HEIGHT: 62 IN | SYSTOLIC BLOOD PRESSURE: 132 MMHG

## 2025-08-17 LAB
ATRIAL RATE: 56 BPM
P AXIS: 88 DEGREES
P OFFSET: 184 MS
P ONSET: 130 MS
PR INTERVAL: 176 MS
Q ONSET: 218 MS
QRS COUNT: 9 BEATS
QRS DURATION: 120 MS
QT INTERVAL: 466 MS
QTC CALCULATION(BAZETT): 449 MS
QTC FREDERICIA: 455 MS
R AXIS: -24 DEGREES
T AXIS: 69 DEGREES
T OFFSET: 451 MS
VENTRICULAR RATE: 56 BPM

## 2025-08-17 PROCEDURE — 1180000001 HC REHAB PRIVATE ROOM DAILY

## 2025-08-17 PROCEDURE — 2500000002 HC RX 250 W HCPCS SELF ADMINISTERED DRUGS (ALT 637 FOR MEDICARE OP, ALT 636 FOR OP/ED): Performed by: INTERNAL MEDICINE

## 2025-08-17 PROCEDURE — 2500000001 HC RX 250 WO HCPCS SELF ADMINISTERED DRUGS (ALT 637 FOR MEDICARE OP): Performed by: INTERNAL MEDICINE

## 2025-08-17 RX ADMIN — PANTOPRAZOLE SODIUM 40 MG: 40 TABLET, DELAYED RELEASE ORAL at 06:01

## 2025-08-17 RX ADMIN — AMLODIPINE BESYLATE 10 MG: 10 TABLET ORAL at 09:09

## 2025-08-17 RX ADMIN — ASPIRIN 81 MG: 81 TABLET, COATED ORAL at 09:09

## 2025-08-17 RX ADMIN — METOPROLOL TARTRATE 100 MG: 100 TABLET, FILM COATED ORAL at 09:09

## 2025-08-17 RX ADMIN — ATORVASTATIN CALCIUM 40 MG: 40 TABLET, FILM COATED ORAL at 20:09

## 2025-08-17 RX ADMIN — CLOPIDOGREL 75 MG: 75 TABLET ORAL at 09:09

## 2025-08-17 RX ADMIN — LOSARTAN POTASSIUM 100 MG: 100 TABLET, FILM COATED ORAL at 06:01

## 2025-08-17 RX ADMIN — BUPROPION HYDROCHLORIDE 150 MG: 150 TABLET, FILM COATED, EXTENDED RELEASE ORAL at 06:01

## 2025-08-17 RX ADMIN — SERTRALINE HYDROCHLORIDE 150 MG: 50 TABLET ORAL at 06:02

## 2025-08-17 RX ADMIN — FENOFIBRATE 54 MG: 54 TABLET ORAL at 09:09

## 2025-08-17 ASSESSMENT — PAIN SCALES - GENERAL
PAINLEVEL_OUTOF10: 0 - NO PAIN
PAINLEVEL_OUTOF10: 0 - NO PAIN

## 2025-08-17 ASSESSMENT — PAIN - FUNCTIONAL ASSESSMENT
PAIN_FUNCTIONAL_ASSESSMENT: 0-10
PAIN_FUNCTIONAL_ASSESSMENT: 0-10

## 2025-08-18 PROCEDURE — 97116 GAIT TRAINING THERAPY: CPT | Mod: GP,CQ

## 2025-08-18 PROCEDURE — 97530 THERAPEUTIC ACTIVITIES: CPT | Mod: GP,CQ

## 2025-08-18 PROCEDURE — 99232 SBSQ HOSP IP/OBS MODERATE 35: CPT | Performed by: INTERNAL MEDICINE

## 2025-08-18 PROCEDURE — 2500000001 HC RX 250 WO HCPCS SELF ADMINISTERED DRUGS (ALT 637 FOR MEDICARE OP): Performed by: INTERNAL MEDICINE

## 2025-08-18 PROCEDURE — 97112 NEUROMUSCULAR REEDUCATION: CPT | Mod: GP,CQ

## 2025-08-18 PROCEDURE — 1180000001 HC REHAB PRIVATE ROOM DAILY

## 2025-08-18 PROCEDURE — 2500000002 HC RX 250 W HCPCS SELF ADMINISTERED DRUGS (ALT 637 FOR MEDICARE OP, ALT 636 FOR OP/ED): Performed by: INTERNAL MEDICINE

## 2025-08-18 PROCEDURE — 97535 SELF CARE MNGMENT TRAINING: CPT | Mod: GO,CO

## 2025-08-18 PROCEDURE — 97110 THERAPEUTIC EXERCISES: CPT | Mod: GP,CQ

## 2025-08-18 PROCEDURE — 92507 TX SP LANG VOICE COMM INDIV: CPT | Mod: GN

## 2025-08-18 RX ADMIN — PANTOPRAZOLE SODIUM 40 MG: 40 TABLET, DELAYED RELEASE ORAL at 06:41

## 2025-08-18 RX ADMIN — SERTRALINE HYDROCHLORIDE 150 MG: 50 TABLET ORAL at 06:41

## 2025-08-18 RX ADMIN — LOSARTAN POTASSIUM 100 MG: 100 TABLET, FILM COATED ORAL at 06:41

## 2025-08-18 RX ADMIN — BUPROPION HYDROCHLORIDE 150 MG: 150 TABLET, FILM COATED, EXTENDED RELEASE ORAL at 06:41

## 2025-08-18 RX ADMIN — ACETAMINOPHEN 650 MG: 325 TABLET ORAL at 21:03

## 2025-08-18 RX ADMIN — CLOPIDOGREL 75 MG: 75 TABLET ORAL at 08:50

## 2025-08-18 RX ADMIN — AMLODIPINE BESYLATE 10 MG: 10 TABLET ORAL at 08:50

## 2025-08-18 RX ADMIN — ASPIRIN 81 MG: 81 TABLET, COATED ORAL at 08:50

## 2025-08-18 RX ADMIN — METOPROLOL TARTRATE 100 MG: 100 TABLET, FILM COATED ORAL at 08:50

## 2025-08-18 RX ADMIN — FENOFIBRATE 54 MG: 54 TABLET ORAL at 08:50

## 2025-08-18 RX ADMIN — ATORVASTATIN CALCIUM 40 MG: 40 TABLET, FILM COATED ORAL at 20:53

## 2025-08-18 ASSESSMENT — BALANCE ASSESSMENTS
STANDING UNSUPPORTED WITH EYES CLOSED: ABLE TO STAND 10 SECONDS SAFELY
STANDING TO SITTING: ABLE TO STAND INDEPENDENTLY USING HANDS
PLACE ALTERNATE FOOT ON STEP OR STOOL WHILE STANDING UNSUPPORTED: LOOKS BEHIND FROM BOTH SIDES AND WEIGHT SHIFTS WELL
PICK UP OBJECT FROM THE FLOOR FROM A STANDING POSITION: ABLE TO PICK UP SLIPPER SAFELY AND EASILY
REACHING FORWARD WITH OUTSTRETCHED ARM WHILE STANDING: CAN REACH FORWARD 5 CM (2 INCHES)
TURN 360 DEGREES: ABLE TO TURN 360 DEGREES SAFELY ONE SIDE ONLY 4 SECONDS OR LESS
STANDING ON ONE LEG: TRIES TO LIFT LEG UNABLE TO HOLD 3 SECONDS BUT REMAINS STANDING INDEPENDENTLY
LONG VERSION TOTAL SCORE (MAX 56): 43
SITTING WITH BACK UNSUPPORTED BUT FEET SUPPORTED ON FLOOR OR ON A STOOL: ABLE TO SIT SAFELY AND SECURELY FOR 2 MINUTES
TRANSFERS: ABLE TO TRANSFER SAFELY DEFINITE NEED OF HANDS
STANDING UNSUPPORTED: ABLE TO STAND SAFELY FOR 2 MINUTES
STANDING UNSUPPORTED WITH FEET TOGETHER: ABLE TO PLACE FEET TOGETHER INDEPENDENTLY AND STAND 1 MINUTE WITH SUPERVISION
STANDING UNSUPPORTED ONE FOOT IN FRONT: NEEDS HELP TO STEP BUT CAN HOLD 15 SECONDS
PLACE ALTERNATE FOOT ON STEP OR STOOL WHILE STANDING UNSUPPORTED: ABLE TO STAND INDEPENDENTLY AND COMPLETE 8 STEPS IN GREATER THAN 20 SECONDS
STANDING TO SITTING: SITS SAFELY WITH MINIMAL USE OF HANDS

## 2025-08-18 ASSESSMENT — PAIN SCALES - GENERAL
PAINLEVEL_OUTOF10: 0 - NO PAIN
PAINLEVEL_OUTOF10: 5 - MODERATE PAIN

## 2025-08-18 ASSESSMENT — PAIN - FUNCTIONAL ASSESSMENT
PAIN_FUNCTIONAL_ASSESSMENT: 0-10
PAIN_FUNCTIONAL_ASSESSMENT: UNABLE TO SELF-REPORT
PAIN_FUNCTIONAL_ASSESSMENT: 0-10

## 2025-08-18 ASSESSMENT — ACTIVITIES OF DAILY LIVING (ADL)
HOME_MANAGEMENT_TIME_ENTRY: 60
BATHING_EQUIPMENT_NEEDED: REMOVEABLE SHOWER HEAD;LONG-HANDLED SPONGE
BATHING_WHERE_ASSESSED: SHOWER
BATHING_LEVEL_OF_ASSISTANCE: SETUP

## 2025-08-18 ASSESSMENT — PAIN DESCRIPTION - DESCRIPTORS: DESCRIPTORS: HEADACHE

## 2025-08-19 PROCEDURE — 97535 SELF CARE MNGMENT TRAINING: CPT | Mod: GO,CO

## 2025-08-19 PROCEDURE — 97110 THERAPEUTIC EXERCISES: CPT | Mod: GO,CO

## 2025-08-19 PROCEDURE — 2500000001 HC RX 250 WO HCPCS SELF ADMINISTERED DRUGS (ALT 637 FOR MEDICARE OP): Performed by: INTERNAL MEDICINE

## 2025-08-19 PROCEDURE — 99232 SBSQ HOSP IP/OBS MODERATE 35: CPT | Performed by: INTERNAL MEDICINE

## 2025-08-19 PROCEDURE — 97110 THERAPEUTIC EXERCISES: CPT | Mod: GP,CQ

## 2025-08-19 PROCEDURE — 97116 GAIT TRAINING THERAPY: CPT | Mod: GP,CQ

## 2025-08-19 PROCEDURE — 92507 TX SP LANG VOICE COMM INDIV: CPT | Mod: GN

## 2025-08-19 PROCEDURE — 1180000001 HC REHAB PRIVATE ROOM DAILY

## 2025-08-19 PROCEDURE — 97530 THERAPEUTIC ACTIVITIES: CPT | Mod: GP,CQ

## 2025-08-19 PROCEDURE — 97530 THERAPEUTIC ACTIVITIES: CPT | Mod: GO,CO

## 2025-08-19 PROCEDURE — 2500000002 HC RX 250 W HCPCS SELF ADMINISTERED DRUGS (ALT 637 FOR MEDICARE OP, ALT 636 FOR OP/ED): Performed by: INTERNAL MEDICINE

## 2025-08-19 PROCEDURE — 97112 NEUROMUSCULAR REEDUCATION: CPT | Mod: GP,CQ

## 2025-08-19 RX ADMIN — PANTOPRAZOLE SODIUM 40 MG: 40 TABLET, DELAYED RELEASE ORAL at 06:35

## 2025-08-19 RX ADMIN — FENOFIBRATE 54 MG: 54 TABLET ORAL at 08:07

## 2025-08-19 RX ADMIN — CLOPIDOGREL 75 MG: 75 TABLET ORAL at 08:07

## 2025-08-19 RX ADMIN — BUPROPION HYDROCHLORIDE 150 MG: 150 TABLET, FILM COATED, EXTENDED RELEASE ORAL at 06:35

## 2025-08-19 RX ADMIN — AMLODIPINE BESYLATE 10 MG: 10 TABLET ORAL at 08:07

## 2025-08-19 RX ADMIN — ATORVASTATIN CALCIUM 40 MG: 40 TABLET, FILM COATED ORAL at 21:08

## 2025-08-19 RX ADMIN — ACETAMINOPHEN 650 MG: 325 TABLET ORAL at 19:50

## 2025-08-19 RX ADMIN — ASPIRIN 81 MG: 81 TABLET, COATED ORAL at 08:07

## 2025-08-19 RX ADMIN — METOPROLOL TARTRATE 100 MG: 100 TABLET, FILM COATED ORAL at 08:07

## 2025-08-19 RX ADMIN — SERTRALINE HYDROCHLORIDE 150 MG: 50 TABLET ORAL at 06:35

## 2025-08-19 RX ADMIN — LOSARTAN POTASSIUM 100 MG: 100 TABLET, FILM COATED ORAL at 06:35

## 2025-08-19 ASSESSMENT — ACTIVITIES OF DAILY LIVING (ADL)
HOME_MANAGEMENT_TIME_ENTRY: 15
EFFECT OF PAIN ON DAILY ACTIVITIES: SLEEP

## 2025-08-19 ASSESSMENT — PAIN SCALES - GENERAL
PAINLEVEL_OUTOF10: 0 - NO PAIN
PAINLEVEL_OUTOF10: 5 - MODERATE PAIN
PAINLEVEL_OUTOF10: 0 - NO PAIN

## 2025-08-19 ASSESSMENT — PAIN DESCRIPTION - ORIENTATION: ORIENTATION: POSTERIOR;ANTERIOR

## 2025-08-19 ASSESSMENT — PAIN DESCRIPTION - DESCRIPTORS: DESCRIPTORS: ACHING

## 2025-08-19 ASSESSMENT — PAIN DESCRIPTION - LOCATION: LOCATION: HEAD

## 2025-08-20 PROCEDURE — 97112 NEUROMUSCULAR REEDUCATION: CPT | Mod: GP,CQ

## 2025-08-20 PROCEDURE — 97530 THERAPEUTIC ACTIVITIES: CPT | Mod: GP,CQ

## 2025-08-20 PROCEDURE — 97530 THERAPEUTIC ACTIVITIES: CPT | Mod: GO,CO

## 2025-08-20 PROCEDURE — 97116 GAIT TRAINING THERAPY: CPT | Mod: GP,CQ

## 2025-08-20 PROCEDURE — 97535 SELF CARE MNGMENT TRAINING: CPT | Mod: GO,CO

## 2025-08-20 PROCEDURE — 1180000001 HC REHAB PRIVATE ROOM DAILY

## 2025-08-20 PROCEDURE — 2500000001 HC RX 250 WO HCPCS SELF ADMINISTERED DRUGS (ALT 637 FOR MEDICARE OP): Performed by: INTERNAL MEDICINE

## 2025-08-20 PROCEDURE — 2500000002 HC RX 250 W HCPCS SELF ADMINISTERED DRUGS (ALT 637 FOR MEDICARE OP, ALT 636 FOR OP/ED): Performed by: INTERNAL MEDICINE

## 2025-08-20 RX ADMIN — ASPIRIN 81 MG: 81 TABLET, COATED ORAL at 08:41

## 2025-08-20 RX ADMIN — FENOFIBRATE 54 MG: 54 TABLET ORAL at 08:41

## 2025-08-20 RX ADMIN — PANTOPRAZOLE SODIUM 40 MG: 40 TABLET, DELAYED RELEASE ORAL at 06:03

## 2025-08-20 RX ADMIN — SERTRALINE HYDROCHLORIDE 150 MG: 50 TABLET ORAL at 06:03

## 2025-08-20 RX ADMIN — ATORVASTATIN CALCIUM 40 MG: 40 TABLET, FILM COATED ORAL at 21:51

## 2025-08-20 RX ADMIN — AMLODIPINE BESYLATE 10 MG: 10 TABLET ORAL at 08:41

## 2025-08-20 RX ADMIN — CLOPIDOGREL 75 MG: 75 TABLET ORAL at 08:41

## 2025-08-20 RX ADMIN — BUPROPION HYDROCHLORIDE 150 MG: 150 TABLET, FILM COATED, EXTENDED RELEASE ORAL at 06:03

## 2025-08-20 RX ADMIN — METOPROLOL TARTRATE 100 MG: 100 TABLET, FILM COATED ORAL at 08:41

## 2025-08-20 RX ADMIN — LOSARTAN POTASSIUM 100 MG: 100 TABLET, FILM COATED ORAL at 06:03

## 2025-08-20 ASSESSMENT — PAIN SCALES - GENERAL
PAINLEVEL_OUTOF10: 0 - NO PAIN

## 2025-08-20 ASSESSMENT — BALANCE ASSESSMENTS
STANDING TO SITTING: ABLE TO STAND INDEPENDENTLY USING HANDS
STANDING ON ONE LEG: TRIES TO LIFT LEG UNABLE TO HOLD 3 SECONDS BUT REMAINS STANDING INDEPENDENTLY
STANDING TO SITTING: SITS SAFELY WITH MINIMAL USE OF HANDS
LONG VERSION TOTAL SCORE (MAX 56): 43
PLACE ALTERNATE FOOT ON STEP OR STOOL WHILE STANDING UNSUPPORTED: LOOKS BEHIND FROM BOTH SIDES AND WEIGHT SHIFTS WELL
TURN 360 DEGREES: ABLE TO TURN 360 DEGREES SAFELY ONE SIDE ONLY 4 SECONDS OR LESS
TRANSFERS: ABLE TO TRANSFER SAFELY DEFINITE NEED OF HANDS
STANDING UNSUPPORTED ONE FOOT IN FRONT: NEEDS HELP TO STEP BUT CAN HOLD 15 SECONDS
REACHING FORWARD WITH OUTSTRETCHED ARM WHILE STANDING: CAN REACH FORWARD 5 CM (2 INCHES)
SITTING WITH BACK UNSUPPORTED BUT FEET SUPPORTED ON FLOOR OR ON A STOOL: ABLE TO SIT SAFELY AND SECURELY FOR 2 MINUTES
STANDING UNSUPPORTED: ABLE TO STAND SAFELY FOR 2 MINUTES
PLACE ALTERNATE FOOT ON STEP OR STOOL WHILE STANDING UNSUPPORTED: ABLE TO STAND INDEPENDENTLY AND COMPLETE 8 STEPS IN GREATER THAN 20 SECONDS
PICK UP OBJECT FROM THE FLOOR FROM A STANDING POSITION: ABLE TO PICK UP SLIPPER SAFELY AND EASILY
STANDING UNSUPPORTED WITH FEET TOGETHER: ABLE TO PLACE FEET TOGETHER INDEPENDENTLY AND STAND 1 MINUTE WITH SUPERVISION
STANDING UNSUPPORTED WITH EYES CLOSED: ABLE TO STAND 10 SECONDS SAFELY

## 2025-08-20 ASSESSMENT — COGNITIVE AND FUNCTIONAL STATUS - GENERAL
TOILETING: A LITTLE
HELP NEEDED FOR BATHING: A LITTLE
DAILY ACTIVITIY SCORE: 21
DRESSING REGULAR LOWER BODY CLOTHING: A LITTLE

## 2025-08-20 ASSESSMENT — ACTIVITIES OF DAILY LIVING (ADL)
HOME_MANAGEMENT_TIME_ENTRY: 30
BATHING_EQUIPMENT_NEEDED: REMOVEABLE SHOWER HEAD
BATHING_LEVEL_OF_ASSISTANCE: SETUP
HOME_MANAGEMENT_TIME_ENTRY: 15
BATHING_WHERE_ASSESSED: SHOWER

## 2025-08-20 ASSESSMENT — PAIN - FUNCTIONAL ASSESSMENT
PAIN_FUNCTIONAL_ASSESSMENT: 0-10

## 2025-08-21 PROCEDURE — 1180000001 HC REHAB PRIVATE ROOM DAILY

## 2025-08-21 PROCEDURE — 2500000004 HC RX 250 GENERAL PHARMACY W/ HCPCS (ALT 636 FOR OP/ED): Performed by: INTERNAL MEDICINE

## 2025-08-21 PROCEDURE — 97530 THERAPEUTIC ACTIVITIES: CPT | Mod: GP,CQ

## 2025-08-21 PROCEDURE — 92507 TX SP LANG VOICE COMM INDIV: CPT | Mod: GN

## 2025-08-21 PROCEDURE — 2500000001 HC RX 250 WO HCPCS SELF ADMINISTERED DRUGS (ALT 637 FOR MEDICARE OP): Performed by: INTERNAL MEDICINE

## 2025-08-21 PROCEDURE — 2500000002 HC RX 250 W HCPCS SELF ADMINISTERED DRUGS (ALT 637 FOR MEDICARE OP, ALT 636 FOR OP/ED): Performed by: INTERNAL MEDICINE

## 2025-08-21 PROCEDURE — 97110 THERAPEUTIC EXERCISES: CPT | Mod: GO,CO

## 2025-08-21 PROCEDURE — 97530 THERAPEUTIC ACTIVITIES: CPT | Mod: GO,CO

## 2025-08-21 PROCEDURE — 97112 NEUROMUSCULAR REEDUCATION: CPT | Mod: GP,CQ

## 2025-08-21 PROCEDURE — 97116 GAIT TRAINING THERAPY: CPT | Mod: GP,CQ

## 2025-08-21 PROCEDURE — 99232 SBSQ HOSP IP/OBS MODERATE 35: CPT | Performed by: INTERNAL MEDICINE

## 2025-08-21 RX ADMIN — SERTRALINE HYDROCHLORIDE 150 MG: 50 TABLET ORAL at 06:03

## 2025-08-21 RX ADMIN — CLOPIDOGREL 75 MG: 75 TABLET ORAL at 09:43

## 2025-08-21 RX ADMIN — PANTOPRAZOLE SODIUM 40 MG: 40 TABLET, DELAYED RELEASE ORAL at 06:03

## 2025-08-21 RX ADMIN — BUPROPION HYDROCHLORIDE 150 MG: 150 TABLET, FILM COATED, EXTENDED RELEASE ORAL at 06:03

## 2025-08-21 RX ADMIN — METOPROLOL TARTRATE 100 MG: 100 TABLET, FILM COATED ORAL at 09:43

## 2025-08-21 RX ADMIN — AMLODIPINE BESYLATE 10 MG: 10 TABLET ORAL at 09:43

## 2025-08-21 RX ADMIN — ASPIRIN 81 MG: 81 TABLET, COATED ORAL at 09:43

## 2025-08-21 RX ADMIN — LOSARTAN POTASSIUM 100 MG: 100 TABLET, FILM COATED ORAL at 06:03

## 2025-08-21 RX ADMIN — ATORVASTATIN CALCIUM 40 MG: 40 TABLET, FILM COATED ORAL at 20:00

## 2025-08-21 RX ADMIN — FENOFIBRATE 54 MG: 54 TABLET ORAL at 09:53

## 2025-08-21 RX ADMIN — POLYETHYLENE GLYCOL 3350 17 G: 17 POWDER, FOR SOLUTION ORAL at 20:14

## 2025-08-21 ASSESSMENT — PAIN SCALES - GENERAL
PAINLEVEL_OUTOF10: 0 - NO PAIN

## 2025-08-21 ASSESSMENT — PAIN - FUNCTIONAL ASSESSMENT
PAIN_FUNCTIONAL_ASSESSMENT: 0-10

## 2025-08-21 ASSESSMENT — COGNITIVE AND FUNCTIONAL STATUS - GENERAL
DAILY ACTIVITIY SCORE: 21
DRESSING REGULAR LOWER BODY CLOTHING: A LITTLE
TOILETING: A LITTLE
HELP NEEDED FOR BATHING: A LITTLE

## 2025-08-22 PROCEDURE — 97116 GAIT TRAINING THERAPY: CPT | Mod: GP,CQ

## 2025-08-22 PROCEDURE — 97530 THERAPEUTIC ACTIVITIES: CPT | Mod: GO,CO

## 2025-08-22 PROCEDURE — 97112 NEUROMUSCULAR REEDUCATION: CPT | Mod: GP,CQ

## 2025-08-22 PROCEDURE — 2500000002 HC RX 250 W HCPCS SELF ADMINISTERED DRUGS (ALT 637 FOR MEDICARE OP, ALT 636 FOR OP/ED): Performed by: INTERNAL MEDICINE

## 2025-08-22 PROCEDURE — 1180000001 HC REHAB PRIVATE ROOM DAILY

## 2025-08-22 PROCEDURE — 97110 THERAPEUTIC EXERCISES: CPT | Mod: GP,CQ

## 2025-08-22 PROCEDURE — 2500000001 HC RX 250 WO HCPCS SELF ADMINISTERED DRUGS (ALT 637 FOR MEDICARE OP): Performed by: INTERNAL MEDICINE

## 2025-08-22 PROCEDURE — 97535 SELF CARE MNGMENT TRAINING: CPT | Mod: GO,CO

## 2025-08-22 PROCEDURE — 97530 THERAPEUTIC ACTIVITIES: CPT | Mod: GP,CQ

## 2025-08-22 RX ADMIN — ATORVASTATIN CALCIUM 40 MG: 40 TABLET, FILM COATED ORAL at 20:43

## 2025-08-22 RX ADMIN — BISACODYL 10 MG: 5 TABLET, COATED ORAL at 20:48

## 2025-08-22 RX ADMIN — CLOPIDOGREL 75 MG: 75 TABLET ORAL at 08:31

## 2025-08-22 RX ADMIN — METOPROLOL TARTRATE 100 MG: 100 TABLET, FILM COATED ORAL at 08:31

## 2025-08-22 RX ADMIN — AMLODIPINE BESYLATE 10 MG: 10 TABLET ORAL at 08:31

## 2025-08-22 RX ADMIN — BUPROPION HYDROCHLORIDE 150 MG: 150 TABLET, FILM COATED, EXTENDED RELEASE ORAL at 06:13

## 2025-08-22 RX ADMIN — ASPIRIN 81 MG: 81 TABLET, COATED ORAL at 08:31

## 2025-08-22 RX ADMIN — LOSARTAN POTASSIUM 100 MG: 100 TABLET, FILM COATED ORAL at 06:13

## 2025-08-22 RX ADMIN — PANTOPRAZOLE SODIUM 40 MG: 40 TABLET, DELAYED RELEASE ORAL at 06:13

## 2025-08-22 RX ADMIN — FENOFIBRATE 54 MG: 54 TABLET ORAL at 08:31

## 2025-08-22 RX ADMIN — SERTRALINE HYDROCHLORIDE 150 MG: 50 TABLET ORAL at 06:13

## 2025-08-22 ASSESSMENT — ACTIVITIES OF DAILY LIVING (ADL)
HOME_MANAGEMENT_TIME_ENTRY: 15
BATHING_LEVEL_OF_ASSISTANCE: SETUP
BATHING_EQUIPMENT_NEEDED: LONG-HANDLED SPONGE;REMOVEABLE SHOWER HEAD
HOME_MANAGEMENT_TIME_ENTRY: 30
BATHING_WHERE_ASSESSED: SHOWER

## 2025-08-22 ASSESSMENT — PAIN SCALES - GENERAL
PAINLEVEL_OUTOF10: 0 - NO PAIN

## 2025-08-22 ASSESSMENT — PAIN - FUNCTIONAL ASSESSMENT
PAIN_FUNCTIONAL_ASSESSMENT: 0-10

## 2025-08-23 PROCEDURE — 2500000002 HC RX 250 W HCPCS SELF ADMINISTERED DRUGS (ALT 637 FOR MEDICARE OP, ALT 636 FOR OP/ED): Performed by: INTERNAL MEDICINE

## 2025-08-23 PROCEDURE — 97112 NEUROMUSCULAR REEDUCATION: CPT | Mod: GP,CQ

## 2025-08-23 PROCEDURE — 97116 GAIT TRAINING THERAPY: CPT | Mod: GP,CQ

## 2025-08-23 PROCEDURE — 1180000001 HC REHAB PRIVATE ROOM DAILY

## 2025-08-23 PROCEDURE — 97110 THERAPEUTIC EXERCISES: CPT | Mod: GP,CQ

## 2025-08-23 PROCEDURE — 2500000001 HC RX 250 WO HCPCS SELF ADMINISTERED DRUGS (ALT 637 FOR MEDICARE OP): Performed by: INTERNAL MEDICINE

## 2025-08-23 RX ADMIN — CLOPIDOGREL 75 MG: 75 TABLET ORAL at 10:07

## 2025-08-23 RX ADMIN — PANTOPRAZOLE SODIUM 40 MG: 40 TABLET, DELAYED RELEASE ORAL at 06:12

## 2025-08-23 RX ADMIN — METOPROLOL TARTRATE 100 MG: 100 TABLET, FILM COATED ORAL at 10:06

## 2025-08-23 RX ADMIN — SERTRALINE HYDROCHLORIDE 150 MG: 50 TABLET ORAL at 06:11

## 2025-08-23 RX ADMIN — AMLODIPINE BESYLATE 10 MG: 10 TABLET ORAL at 10:07

## 2025-08-23 RX ADMIN — ATORVASTATIN CALCIUM 40 MG: 40 TABLET, FILM COATED ORAL at 21:07

## 2025-08-23 RX ADMIN — BUPROPION HYDROCHLORIDE 150 MG: 150 TABLET, FILM COATED, EXTENDED RELEASE ORAL at 07:53

## 2025-08-23 RX ADMIN — FENOFIBRATE 54 MG: 54 TABLET ORAL at 10:07

## 2025-08-23 RX ADMIN — LOSARTAN POTASSIUM 100 MG: 100 TABLET, FILM COATED ORAL at 06:11

## 2025-08-23 RX ADMIN — ASPIRIN 81 MG: 81 TABLET, COATED ORAL at 10:07

## 2025-08-23 ASSESSMENT — PAIN - FUNCTIONAL ASSESSMENT
PAIN_FUNCTIONAL_ASSESSMENT: 0-10

## 2025-08-23 ASSESSMENT — PAIN SCALES - GENERAL
PAINLEVEL_OUTOF10: 0 - NO PAIN

## 2025-08-24 VITALS
BODY MASS INDEX: 29.01 KG/M2 | DIASTOLIC BLOOD PRESSURE: 50 MMHG | HEART RATE: 60 BPM | RESPIRATION RATE: 18 BRPM | TEMPERATURE: 97.9 F | WEIGHT: 157.63 LBS | HEIGHT: 62 IN | SYSTOLIC BLOOD PRESSURE: 117 MMHG | OXYGEN SATURATION: 98 %

## 2025-08-24 PROCEDURE — 2500000002 HC RX 250 W HCPCS SELF ADMINISTERED DRUGS (ALT 637 FOR MEDICARE OP, ALT 636 FOR OP/ED): Performed by: INTERNAL MEDICINE

## 2025-08-24 PROCEDURE — 1180000001 HC REHAB PRIVATE ROOM DAILY

## 2025-08-24 PROCEDURE — 2500000001 HC RX 250 WO HCPCS SELF ADMINISTERED DRUGS (ALT 637 FOR MEDICARE OP): Performed by: INTERNAL MEDICINE

## 2025-08-24 RX ADMIN — LOSARTAN POTASSIUM 100 MG: 100 TABLET, FILM COATED ORAL at 06:09

## 2025-08-24 RX ADMIN — BISACODYL 10 MG: 5 TABLET, COATED ORAL at 20:05

## 2025-08-24 RX ADMIN — SERTRALINE HYDROCHLORIDE 150 MG: 50 TABLET ORAL at 06:09

## 2025-08-24 RX ADMIN — AMLODIPINE BESYLATE 10 MG: 10 TABLET ORAL at 08:37

## 2025-08-24 RX ADMIN — ASPIRIN 81 MG: 81 TABLET, COATED ORAL at 08:37

## 2025-08-24 RX ADMIN — BUPROPION HYDROCHLORIDE 150 MG: 150 TABLET, FILM COATED, EXTENDED RELEASE ORAL at 06:09

## 2025-08-24 RX ADMIN — PANTOPRAZOLE SODIUM 40 MG: 40 TABLET, DELAYED RELEASE ORAL at 06:09

## 2025-08-24 RX ADMIN — ATORVASTATIN CALCIUM 40 MG: 40 TABLET, FILM COATED ORAL at 20:05

## 2025-08-24 RX ADMIN — CLOPIDOGREL 75 MG: 75 TABLET ORAL at 08:37

## 2025-08-24 RX ADMIN — FENOFIBRATE 54 MG: 54 TABLET ORAL at 08:38

## 2025-08-24 RX ADMIN — METOPROLOL TARTRATE 100 MG: 100 TABLET, FILM COATED ORAL at 08:37

## 2025-08-24 ASSESSMENT — PAIN SCALES - GENERAL
PAINLEVEL_OUTOF10: 0 - NO PAIN

## 2025-08-24 ASSESSMENT — PAIN - FUNCTIONAL ASSESSMENT
PAIN_FUNCTIONAL_ASSESSMENT: 0-10

## 2025-08-25 PROCEDURE — 2500000002 HC RX 250 W HCPCS SELF ADMINISTERED DRUGS (ALT 637 FOR MEDICARE OP, ALT 636 FOR OP/ED): Performed by: INTERNAL MEDICINE

## 2025-08-25 PROCEDURE — 1180000001 HC REHAB PRIVATE ROOM DAILY

## 2025-08-25 PROCEDURE — 97116 GAIT TRAINING THERAPY: CPT | Mod: GP,CQ

## 2025-08-25 PROCEDURE — 97110 THERAPEUTIC EXERCISES: CPT | Mod: GP,CQ

## 2025-08-25 PROCEDURE — 99232 SBSQ HOSP IP/OBS MODERATE 35: CPT | Performed by: INTERNAL MEDICINE

## 2025-08-25 PROCEDURE — 97530 THERAPEUTIC ACTIVITIES: CPT | Mod: GP,CQ

## 2025-08-25 PROCEDURE — 97535 SELF CARE MNGMENT TRAINING: CPT | Mod: CO,GO

## 2025-08-25 PROCEDURE — 2500000001 HC RX 250 WO HCPCS SELF ADMINISTERED DRUGS (ALT 637 FOR MEDICARE OP): Performed by: INTERNAL MEDICINE

## 2025-08-25 PROCEDURE — 97530 THERAPEUTIC ACTIVITIES: CPT | Mod: GO

## 2025-08-25 PROCEDURE — 97112 NEUROMUSCULAR REEDUCATION: CPT | Mod: GP,CQ

## 2025-08-25 RX ORDER — CLOPIDOGREL BISULFATE 75 MG/1
75 TABLET ORAL DAILY
Qty: 7 TABLET | Refills: 0 | Status: SHIPPED | OUTPATIENT
Start: 2025-08-25 | End: 2025-09-02

## 2025-08-25 RX ORDER — PANTOPRAZOLE SODIUM 40 MG/1
40 TABLET, DELAYED RELEASE ORAL
Start: 2025-08-26

## 2025-08-25 RX ORDER — ATORVASTATIN CALCIUM 40 MG/1
40 TABLET, FILM COATED ORAL
Qty: 30 TABLET | Refills: 0 | Status: SHIPPED | OUTPATIENT
Start: 2025-08-25 | End: 2025-09-24

## 2025-08-25 RX ADMIN — PANTOPRAZOLE SODIUM 40 MG: 40 TABLET, DELAYED RELEASE ORAL at 06:33

## 2025-08-25 RX ADMIN — SERTRALINE HYDROCHLORIDE 150 MG: 50 TABLET ORAL at 06:33

## 2025-08-25 RX ADMIN — ASPIRIN 81 MG: 81 TABLET, COATED ORAL at 08:52

## 2025-08-25 RX ADMIN — ATORVASTATIN CALCIUM 40 MG: 40 TABLET, FILM COATED ORAL at 20:33

## 2025-08-25 RX ADMIN — FENOFIBRATE 54 MG: 54 TABLET ORAL at 08:53

## 2025-08-25 RX ADMIN — METOPROLOL TARTRATE 100 MG: 100 TABLET, FILM COATED ORAL at 08:52

## 2025-08-25 RX ADMIN — CLOPIDOGREL 75 MG: 75 TABLET ORAL at 08:52

## 2025-08-25 RX ADMIN — LOSARTAN POTASSIUM 100 MG: 100 TABLET, FILM COATED ORAL at 06:33

## 2025-08-25 RX ADMIN — AMLODIPINE BESYLATE 10 MG: 10 TABLET ORAL at 08:52

## 2025-08-25 RX ADMIN — BUPROPION HYDROCHLORIDE 150 MG: 150 TABLET, FILM COATED, EXTENDED RELEASE ORAL at 06:33

## 2025-08-25 ASSESSMENT — PAIN - FUNCTIONAL ASSESSMENT
PAIN_FUNCTIONAL_ASSESSMENT: 0-10

## 2025-08-25 ASSESSMENT — COGNITIVE AND FUNCTIONAL STATUS - GENERAL
DAILY ACTIVITIY SCORE: 23
HELP NEEDED FOR BATHING: A LITTLE

## 2025-08-25 ASSESSMENT — BALANCE ASSESSMENTS
STANDING UNSUPPORTED WITH EYES CLOSED: ABLE TO STAND 10 SECONDS SAFELY
REACHING FORWARD WITH OUTSTRETCHED ARM WHILE STANDING: CAN REACH FORWARD 5 CM (2 INCHES)
STANDING TO SITTING: ABLE TO STAND INDEPENDENTLY USING HANDS
SITTING WITH BACK UNSUPPORTED BUT FEET SUPPORTED ON FLOOR OR ON A STOOL: ABLE TO SIT SAFELY AND SECURELY FOR 2 MINUTES
STANDING UNSUPPORTED ONE FOOT IN FRONT: ABLE TO PLACE FOOT AHEAD INDEPENDENTLY AND HOLD 30 SECONDS
STANDING ON ONE LEG: TRIES TO LIFT LEG UNABLE TO HOLD 3 SECONDS BUT REMAINS STANDING INDEPENDENTLY
TRANSFERS: ABLE TO TRANSFER SAFELY DEFINITE NEED OF HANDS
STANDING UNSUPPORTED: ABLE TO STAND SAFELY FOR 2 MINUTES
PLACE ALTERNATE FOOT ON STEP OR STOOL WHILE STANDING UNSUPPORTED: LOOKS BEHIND FROM BOTH SIDES AND WEIGHT SHIFTS WELL
PICK UP OBJECT FROM THE FLOOR FROM A STANDING POSITION: ABLE TO PICK UP SLIPPER SAFELY AND EASILY
PLACE ALTERNATE FOOT ON STEP OR STOOL WHILE STANDING UNSUPPORTED: ABLE TO STAND INDEPENDENTLY AND COMPLETE 8 STEPS IN GREATER THAN 20 SECONDS
LONG VERSION TOTAL SCORE (MAX 56): 45
STANDING TO SITTING: SITS SAFELY WITH MINIMAL USE OF HANDS
STANDING UNSUPPORTED WITH FEET TOGETHER: ABLE TO PLACE FEET TOGETHER INDEPENDENTLY AND STAND 1 MINUTE WITH SUPERVISION
TURN 360 DEGREES: ABLE TO TURN 360 DEGREES SAFELY ONE SIDE ONLY 4 SECONDS OR LESS

## 2025-08-25 ASSESSMENT — BRIEF INTERVIEW FOR MENTAL STATUS (BIMS)
ASKED TO RECALL BLUE: YES, NO CUE REQUIRED
WHAT YEAR IS IT: CORRECT
WHAT DAY OF THE WEEK IS IT: CORRECT
ASKED TO RECALL SOCK: YES, NO CUE REQUIRED
BIMS SUMMARY SCORE: 15
GENERAL FUNCTIONAL COGNITION RATING: INDEPENDENT
COGNITIVE PATTERN ASSESSMENT USED: BIMS
WHAT MONTH IS IT: ACCURATE WITHIN 5 DAYS
ASKED TO RECALL BED: YES, NO CUE REQUIRED
INITIAL REPETITION OF BED BLUE SOCK - FIRST ATTEMPT: 3

## 2025-08-25 ASSESSMENT — PAIN SCALES - GENERAL
PAINLEVEL_OUTOF10: 0 - NO PAIN

## 2025-08-25 ASSESSMENT — ACTIVITIES OF DAILY LIVING (ADL)
BATHING_LEVEL_OF_ASSISTANCE: MODIFIED INDEPENDENT
BATHING_EQUIPMENT_NEEDED: LONG-HANDLED SPONGE;REMOVEABLE SHOWER HEAD
HOME_MANAGEMENT_TIME_ENTRY: 60
BATHING_WHERE_ASSESSED: SHOWER

## 2025-08-26 ENCOUNTER — PHARMACY VISIT (OUTPATIENT)
Dept: PHARMACY | Facility: CLINIC | Age: 80
End: 2025-08-26
Payer: MEDICARE

## 2025-08-26 VITALS
HEART RATE: 72 BPM | WEIGHT: 157.63 LBS | HEIGHT: 62 IN | RESPIRATION RATE: 17 BRPM | BODY MASS INDEX: 29.01 KG/M2 | OXYGEN SATURATION: 100 % | TEMPERATURE: 97 F | DIASTOLIC BLOOD PRESSURE: 56 MMHG | SYSTOLIC BLOOD PRESSURE: 124 MMHG

## 2025-08-26 PROCEDURE — 2500000002 HC RX 250 W HCPCS SELF ADMINISTERED DRUGS (ALT 637 FOR MEDICARE OP, ALT 636 FOR OP/ED): Performed by: INTERNAL MEDICINE

## 2025-08-26 PROCEDURE — RXMED WILLOW AMBULATORY MEDICATION CHARGE

## 2025-08-26 PROCEDURE — 2500000001 HC RX 250 WO HCPCS SELF ADMINISTERED DRUGS (ALT 637 FOR MEDICARE OP): Performed by: INTERNAL MEDICINE

## 2025-08-26 PROCEDURE — 99232 SBSQ HOSP IP/OBS MODERATE 35: CPT | Performed by: INTERNAL MEDICINE

## 2025-08-26 RX ADMIN — ASPIRIN 81 MG: 81 TABLET, COATED ORAL at 09:01

## 2025-08-26 RX ADMIN — PANTOPRAZOLE SODIUM 40 MG: 40 TABLET, DELAYED RELEASE ORAL at 06:07

## 2025-08-26 RX ADMIN — FENOFIBRATE 54 MG: 54 TABLET ORAL at 09:02

## 2025-08-26 RX ADMIN — CLOPIDOGREL 75 MG: 75 TABLET ORAL at 09:01

## 2025-08-26 RX ADMIN — METOPROLOL TARTRATE 100 MG: 100 TABLET, FILM COATED ORAL at 13:48

## 2025-08-26 RX ADMIN — LOSARTAN POTASSIUM 100 MG: 100 TABLET, FILM COATED ORAL at 06:07

## 2025-08-26 RX ADMIN — AMLODIPINE BESYLATE 10 MG: 10 TABLET ORAL at 09:01

## 2025-08-26 RX ADMIN — SERTRALINE HYDROCHLORIDE 150 MG: 50 TABLET ORAL at 06:07

## 2025-08-26 RX ADMIN — BUPROPION HYDROCHLORIDE 150 MG: 150 TABLET, FILM COATED, EXTENDED RELEASE ORAL at 06:07

## 2025-08-26 ASSESSMENT — PATIENT HEALTH QUESTIONNAIRE - PHQ9
SUM OF ALL RESPONSES TO PHQ9 QUESTIONS 1 & 2: 0
1. LITTLE INTEREST OR PLEASURE IN DOING THINGS: NOT AT ALL
2. FEELING DOWN, DEPRESSED OR HOPELESS: NOT AT ALL

## 2025-08-26 ASSESSMENT — PAIN SCALES - GENERAL: PAINLEVEL_OUTOF10: 0 - NO PAIN

## 2025-08-26 ASSESSMENT — PAIN - FUNCTIONAL ASSESSMENT: PAIN_FUNCTIONAL_ASSESSMENT: 0-10

## 2025-09-23 ENCOUNTER — APPOINTMENT (OUTPATIENT)
Dept: CARDIOLOGY | Facility: CLINIC | Age: 80
End: 2025-09-23
Payer: MEDICARE